# Patient Record
Sex: MALE | Race: WHITE | Employment: OTHER | URBAN - METROPOLITAN AREA
[De-identification: names, ages, dates, MRNs, and addresses within clinical notes are randomized per-mention and may not be internally consistent; named-entity substitution may affect disease eponyms.]

---

## 2019-06-28 ENCOUNTER — TRANSFERRED RECORDS (OUTPATIENT)
Dept: HEALTH INFORMATION MANAGEMENT | Facility: CLINIC | Age: 79
End: 2019-06-28

## 2019-06-28 LAB — INR PPP: NORMAL

## 2019-06-29 LAB
CREAT SERPL-MCNC: 0.84 MG/DL (ref 0.72–1.25)
GFR SERPL CREATININE-BSD FRML MDRD: >60 ML/MIN/1.73M(2)
GLUCOSE SERPL-MCNC: 110 MG/DL (ref 70–100)
POTASSIUM SERPL-SCNC: 3.9 MMOL/L (ref 3.5–5.1)

## 2019-06-30 ENCOUNTER — HOSPITAL ENCOUNTER (INPATIENT)
Facility: CLINIC | Age: 79
LOS: 9 days | Discharge: NURSING, REHAB OR LONG-TERM CARE | DRG: 467 | End: 2019-07-09
Attending: ORTHOPAEDIC SURGERY | Admitting: ORTHOPAEDIC SURGERY
Payer: COMMERCIAL

## 2019-06-30 DIAGNOSIS — M97.8XXA PERIPROSTHETIC FRACTURE OF SHAFT OF FEMUR: Primary | ICD-10-CM

## 2019-06-30 DIAGNOSIS — Z96.649 PERIPROSTHETIC FRACTURE OF SHAFT OF FEMUR: Primary | ICD-10-CM

## 2019-06-30 DIAGNOSIS — Z98.890 STATUS POST HIP SURGERY: ICD-10-CM

## 2019-06-30 LAB
ABO + RH BLD: NORMAL
ABO + RH BLD: NORMAL
ANION GAP SERPL CALCULATED.3IONS-SCNC: 9 MMOL/L (ref 3–14)
BASOPHILS # BLD AUTO: 0 10E9/L (ref 0–0.2)
BASOPHILS NFR BLD AUTO: 0.2 %
BLD GP AB SCN SERPL QL: NORMAL
BLOOD BANK CMNT PATIENT-IMP: NORMAL
BUN SERPL-MCNC: 21 MG/DL (ref 7–30)
CALCIUM SERPL-MCNC: 8.6 MG/DL (ref 8.5–10.1)
CHLORIDE SERPL-SCNC: 104 MMOL/L (ref 94–109)
CO2 SERPL-SCNC: 25 MMOL/L (ref 20–32)
CREAT SERPL-MCNC: 0.76 MG/DL (ref 0.66–1.25)
DIFFERENTIAL METHOD BLD: ABNORMAL
EOSINOPHIL # BLD AUTO: 0 10E9/L (ref 0–0.7)
EOSINOPHIL NFR BLD AUTO: 0.4 %
ERYTHROCYTE [DISTWIDTH] IN BLOOD BY AUTOMATED COUNT: 13.1 % (ref 10–15)
GFR SERPL CREATININE-BSD FRML MDRD: 87 ML/MIN/{1.73_M2}
GLUCOSE SERPL-MCNC: 181 MG/DL (ref 70–99)
HCT VFR BLD AUTO: 40.2 % (ref 40–53)
HGB BLD-MCNC: 13.7 G/DL (ref 13.3–17.7)
IMM GRANULOCYTES # BLD: 0 10E9/L (ref 0–0.4)
IMM GRANULOCYTES NFR BLD: 0.2 %
INR PPP: 1.1 (ref 0.86–1.14)
LYMPHOCYTES # BLD AUTO: 1.1 10E9/L (ref 0.8–5.3)
LYMPHOCYTES NFR BLD AUTO: 14.1 %
MCH RBC QN AUTO: 33.1 PG (ref 26.5–33)
MCHC RBC AUTO-ENTMCNC: 34.1 G/DL (ref 31.5–36.5)
MCV RBC AUTO: 97 FL (ref 78–100)
MONOCYTES # BLD AUTO: 1 10E9/L (ref 0–1.3)
MONOCYTES NFR BLD AUTO: 12.1 %
NEUTROPHILS # BLD AUTO: 5.9 10E9/L (ref 1.6–8.3)
NEUTROPHILS NFR BLD AUTO: 73 %
NRBC # BLD AUTO: 0 10*3/UL
NRBC BLD AUTO-RTO: 0 /100
PLATELET # BLD AUTO: 147 10E9/L (ref 150–450)
POTASSIUM SERPL-SCNC: 4.1 MMOL/L (ref 3.4–5.3)
RBC # BLD AUTO: 4.14 10E12/L (ref 4.4–5.9)
SODIUM SERPL-SCNC: 138 MMOL/L (ref 133–144)
SPECIMEN EXP DATE BLD: NORMAL
WBC # BLD AUTO: 8.1 10E9/L (ref 4–11)

## 2019-06-30 PROCEDURE — 25800030 ZZH RX IP 258 OP 636: Performed by: STUDENT IN AN ORGANIZED HEALTH CARE EDUCATION/TRAINING PROGRAM

## 2019-06-30 PROCEDURE — 85610 PROTHROMBIN TIME: CPT | Performed by: INTERNAL MEDICINE

## 2019-06-30 PROCEDURE — 86901 BLOOD TYPING SEROLOGIC RH(D): CPT | Performed by: INTERNAL MEDICINE

## 2019-06-30 PROCEDURE — 85025 COMPLETE CBC W/AUTO DIFF WBC: CPT | Performed by: INTERNAL MEDICINE

## 2019-06-30 PROCEDURE — 93005 ELECTROCARDIOGRAM TRACING: CPT

## 2019-06-30 PROCEDURE — 12000001 ZZH R&B MED SURG/OB UMMC

## 2019-06-30 PROCEDURE — 25000132 ZZH RX MED GY IP 250 OP 250 PS 637: Performed by: STUDENT IN AN ORGANIZED HEALTH CARE EDUCATION/TRAINING PROGRAM

## 2019-06-30 PROCEDURE — 86850 RBC ANTIBODY SCREEN: CPT | Performed by: INTERNAL MEDICINE

## 2019-06-30 PROCEDURE — HZ2ZZZZ DETOXIFICATION SERVICES FOR SUBSTANCE ABUSE TREATMENT: ICD-10-PCS | Performed by: INTERNAL MEDICINE

## 2019-06-30 PROCEDURE — 86900 BLOOD TYPING SEROLOGIC ABO: CPT | Performed by: INTERNAL MEDICINE

## 2019-06-30 PROCEDURE — 25000128 H RX IP 250 OP 636: Performed by: STUDENT IN AN ORGANIZED HEALTH CARE EDUCATION/TRAINING PROGRAM

## 2019-06-30 PROCEDURE — 36415 COLL VENOUS BLD VENIPUNCTURE: CPT | Performed by: INTERNAL MEDICINE

## 2019-06-30 PROCEDURE — 80048 BASIC METABOLIC PNL TOTAL CA: CPT | Performed by: INTERNAL MEDICINE

## 2019-06-30 PROCEDURE — 99207 ZZC MOONLIGHTING INDICATOR: CPT | Performed by: INTERNAL MEDICINE

## 2019-06-30 PROCEDURE — 93010 ELECTROCARDIOGRAM REPORT: CPT | Performed by: INTERNAL MEDICINE

## 2019-06-30 RX ORDER — LORAZEPAM 0.5 MG/1
1-4 TABLET ORAL EVERY 30 MIN PRN
Status: DISCONTINUED | OUTPATIENT
Start: 2019-06-30 | End: 2019-07-03

## 2019-06-30 RX ORDER — AMOXICILLIN 250 MG
2 CAPSULE ORAL 2 TIMES DAILY
Status: DISCONTINUED | OUTPATIENT
Start: 2019-06-30 | End: 2019-07-02

## 2019-06-30 RX ORDER — ONDANSETRON 2 MG/ML
4 INJECTION INTRAMUSCULAR; INTRAVENOUS EVERY 6 HOURS PRN
Status: DISCONTINUED | OUTPATIENT
Start: 2019-06-30 | End: 2019-07-02

## 2019-06-30 RX ORDER — ALBUTEROL SULFATE 5 MG/ML
2.5 SOLUTION RESPIRATORY (INHALATION)
Status: DISCONTINUED | OUTPATIENT
Start: 2019-06-30 | End: 2019-07-09 | Stop reason: HOSPADM

## 2019-06-30 RX ORDER — MULTIPLE VITAMINS W/ MINERALS TAB 9MG-400MCG
1 TAB ORAL DAILY
Status: DISCONTINUED | OUTPATIENT
Start: 2019-06-30 | End: 2019-07-09 | Stop reason: HOSPADM

## 2019-06-30 RX ORDER — METHOCARBAMOL 500 MG/1
500 TABLET, FILM COATED ORAL 4 TIMES DAILY PRN
Status: DISCONTINUED | OUTPATIENT
Start: 2019-06-30 | End: 2019-07-09 | Stop reason: HOSPADM

## 2019-06-30 RX ORDER — ALLOPURINOL 300 MG/1
300 TABLET ORAL DAILY
Status: DISCONTINUED | OUTPATIENT
Start: 2019-07-01 | End: 2019-07-02

## 2019-06-30 RX ORDER — METOPROLOL SUCCINATE 25 MG/1
50 TABLET, EXTENDED RELEASE ORAL DAILY
Status: DISCONTINUED | OUTPATIENT
Start: 2019-07-01 | End: 2019-07-09 | Stop reason: HOSPADM

## 2019-06-30 RX ORDER — SODIUM CHLORIDE 9 MG/ML
INJECTION, SOLUTION INTRAVENOUS
Status: DISCONTINUED
Start: 2019-06-30 | End: 2019-07-01 | Stop reason: HOSPADM

## 2019-06-30 RX ORDER — PROCHLORPERAZINE MALEATE 5 MG
5 TABLET ORAL EVERY 6 HOURS PRN
Status: DISCONTINUED | OUTPATIENT
Start: 2019-06-30 | End: 2019-07-02

## 2019-06-30 RX ORDER — ALBUTEROL SULFATE 90 UG/1
2 AEROSOL, METERED RESPIRATORY (INHALATION) EVERY 6 HOURS PRN
Status: DISCONTINUED | OUTPATIENT
Start: 2019-06-30 | End: 2019-07-09 | Stop reason: HOSPADM

## 2019-06-30 RX ORDER — ACETAMINOPHEN 325 MG/1
650 TABLET ORAL EVERY 4 HOURS PRN
Status: DISCONTINUED | OUTPATIENT
Start: 2019-07-03 | End: 2019-07-02

## 2019-06-30 RX ORDER — AMOXICILLIN 250 MG
1 CAPSULE ORAL 2 TIMES DAILY
Status: DISCONTINUED | OUTPATIENT
Start: 2019-06-30 | End: 2019-07-02

## 2019-06-30 RX ORDER — ACETAMINOPHEN 325 MG/1
975 TABLET ORAL EVERY 8 HOURS
Status: DISCONTINUED | OUTPATIENT
Start: 2019-06-30 | End: 2019-07-02

## 2019-06-30 RX ORDER — NALOXONE HYDROCHLORIDE 0.4 MG/ML
.1-.4 INJECTION, SOLUTION INTRAMUSCULAR; INTRAVENOUS; SUBCUTANEOUS
Status: DISCONTINUED | OUTPATIENT
Start: 2019-06-30 | End: 2019-07-02

## 2019-06-30 RX ORDER — HYDROMORPHONE HYDROCHLORIDE 1 MG/ML
.3-.5 INJECTION, SOLUTION INTRAMUSCULAR; INTRAVENOUS; SUBCUTANEOUS
Status: DISCONTINUED | OUTPATIENT
Start: 2019-06-30 | End: 2019-07-02

## 2019-06-30 RX ORDER — OXYCODONE HYDROCHLORIDE 5 MG/1
5-10 TABLET ORAL EVERY 4 HOURS PRN
Status: DISCONTINUED | OUTPATIENT
Start: 2019-06-30 | End: 2019-07-01

## 2019-06-30 RX ORDER — FOLIC ACID 1 MG/1
1 TABLET ORAL DAILY
Status: DISCONTINUED | OUTPATIENT
Start: 2019-06-30 | End: 2019-07-09 | Stop reason: HOSPADM

## 2019-06-30 RX ORDER — SODIUM CHLORIDE 9 MG/ML
INJECTION, SOLUTION INTRAVENOUS CONTINUOUS
Status: DISCONTINUED | OUTPATIENT
Start: 2019-06-30 | End: 2019-07-02

## 2019-06-30 RX ORDER — LANOLIN ALCOHOL/MO/W.PET/CERES
100 CREAM (GRAM) TOPICAL DAILY
Status: DISCONTINUED | OUTPATIENT
Start: 2019-06-30 | End: 2019-07-09 | Stop reason: HOSPADM

## 2019-06-30 RX ORDER — ONDANSETRON 4 MG/1
4 TABLET, ORALLY DISINTEGRATING ORAL EVERY 6 HOURS PRN
Status: DISCONTINUED | OUTPATIENT
Start: 2019-06-30 | End: 2019-07-02

## 2019-06-30 RX ORDER — LIDOCAINE 40 MG/G
CREAM TOPICAL
Status: DISCONTINUED | OUTPATIENT
Start: 2019-06-30 | End: 2019-07-02

## 2019-06-30 RX ADMIN — ACETAMINOPHEN 975 MG: 325 TABLET, FILM COATED ORAL at 17:47

## 2019-06-30 RX ADMIN — SODIUM CHLORIDE, PRESERVATIVE FREE: 5 INJECTION INTRAVENOUS at 18:50

## 2019-06-30 RX ADMIN — OXYCODONE HYDROCHLORIDE 5 MG: 5 TABLET ORAL at 20:55

## 2019-06-30 RX ADMIN — SENNOSIDES AND DOCUSATE SODIUM 1 TABLET: 8.6; 5 TABLET ORAL at 20:55

## 2019-06-30 RX ADMIN — HYDROMORPHONE HYDROCHLORIDE 0.5 MG: 1 INJECTION, SOLUTION INTRAMUSCULAR; INTRAVENOUS; SUBCUTANEOUS at 17:47

## 2019-06-30 ASSESSMENT — ACTIVITIES OF DAILY LIVING (ADL): ADLS_ACUITY_SCORE: 18

## 2019-06-30 NOTE — LETTER
Transition Communication Hand-off for Care Transitions to Next Level of Care Provider    Name: Varun Ramirez  : 1940  MRN #: [id number]  Primary Care Provider: Physician No Ref-Primary     Primary Clinic: No address on file     Reason for Hospitalization:  Chantal prosthetic femur fracture  Periprosthetic fracture of shaft of femur  Status post hip surgery  Admit Date/Time: 2019  5:21 PM  Discharge Date: 19  Payor Source: Payor: [employer] / Plan: [employer] MEDICARE ADVANTAGE / Product Type: Medicare /            Reason for Communication Hand-off Referral: Other discharge plan    Discharge Plan:  [facility] [phone], Will be followed by Dr. Neil Whitfield [phone]     Concern for non-adherence with plan of care:   Y/N N  Discharge Needs Assessment:  Needs      Most Recent Value   Anticipated Changes Related to Illness  inability to care for self   Equipment Currently Used at Home  none             Follow-up plan:    Future Appointments   Date Time Provider Department Center   2019  1:45 PM Jerome Valdez MD [facility]       Any outstanding tests or procedures:        Referrals     Future Labs/Procedures    Occupational Therapy Adult Consult     Comments:    Evaluate and treat as clinically indicated.    Reason:    - ORIF of  Left Periprosthetic Femur Proximal, Left Revision ARNOLDO on 2019 with Dr. Valdez    Physical Therapy Adult Consult     Comments:    Evaluate and treat as clinically indicated.    Reason:   - ORIF of  Left Periprosthetic Femur Proximal, Left Revision ARNOLDO on 2019 with Dr. José Miguel Arriaza, SW  8a/10A Ortho/Med/Surg and Adult [facility]    [phone]  [email or web address]    AVS/Discharge Summary is the source of truth; this is a helpful guide for improved communication of patient story

## 2019-07-01 ENCOUNTER — ANESTHESIA EVENT (OUTPATIENT)
Dept: SURGERY | Facility: CLINIC | Age: 79
DRG: 467 | End: 2019-07-01
Payer: COMMERCIAL

## 2019-07-01 ENCOUNTER — TEAM CONFERENCE (OUTPATIENT)
Dept: ORTHOPEDICS | Facility: CLINIC | Age: 79
End: 2019-07-01

## 2019-07-01 LAB
CA-I BLD-MCNC: 4.7 MG/DL (ref 4.4–5.2)
MAGNESIUM SERPL-MCNC: 2 MG/DL (ref 1.6–2.3)
PHOSPHATE SERPL-MCNC: 3.6 MG/DL (ref 2.5–4.5)

## 2019-07-01 PROCEDURE — 25800030 ZZH RX IP 258 OP 636: Performed by: STUDENT IN AN ORGANIZED HEALTH CARE EDUCATION/TRAINING PROGRAM

## 2019-07-01 PROCEDURE — 25000132 ZZH RX MED GY IP 250 OP 250 PS 637: Performed by: INTERNAL MEDICINE

## 2019-07-01 PROCEDURE — 25000132 ZZH RX MED GY IP 250 OP 250 PS 637: Performed by: STUDENT IN AN ORGANIZED HEALTH CARE EDUCATION/TRAINING PROGRAM

## 2019-07-01 PROCEDURE — 99232 SBSQ HOSP IP/OBS MODERATE 35: CPT | Performed by: INTERNAL MEDICINE

## 2019-07-01 PROCEDURE — 12000001 ZZH R&B MED SURG/OB UMMC

## 2019-07-01 PROCEDURE — 83735 ASSAY OF MAGNESIUM: CPT | Performed by: INTERNAL MEDICINE

## 2019-07-01 PROCEDURE — 84100 ASSAY OF PHOSPHORUS: CPT | Performed by: INTERNAL MEDICINE

## 2019-07-01 PROCEDURE — 82330 ASSAY OF CALCIUM: CPT | Performed by: INTERNAL MEDICINE

## 2019-07-01 PROCEDURE — 99207 ZZC CDG-MDM COMPONENT: MEETS LOW - DOWN CODED: CPT | Performed by: INTERNAL MEDICINE

## 2019-07-01 PROCEDURE — 36415 COLL VENOUS BLD VENIPUNCTURE: CPT | Performed by: INTERNAL MEDICINE

## 2019-07-01 RX ORDER — LISINOPRIL 20 MG/1
20 TABLET ORAL DAILY
COMMUNITY

## 2019-07-01 RX ORDER — ALLOPURINOL 300 MG/1
300 TABLET ORAL DAILY
COMMUNITY

## 2019-07-01 RX ORDER — LORATADINE 10 MG/1
10 TABLET ORAL DAILY PRN
Status: ON HOLD | COMMUNITY
End: 2019-07-09

## 2019-07-01 RX ORDER — OMEPRAZOLE 20 MG/1
20 TABLET, DELAYED RELEASE ORAL DAILY
Status: ON HOLD | COMMUNITY
End: 2019-07-09

## 2019-07-01 RX ORDER — SODIUM CHLORIDE 9 MG/ML
INJECTION, SOLUTION INTRAVENOUS
Status: DISCONTINUED
Start: 2019-07-01 | End: 2019-07-02 | Stop reason: HOSPADM

## 2019-07-01 RX ORDER — IBUPROFEN 200 MG
200 TABLET ORAL 2 TIMES DAILY PRN
Status: ON HOLD | COMMUNITY
End: 2019-07-09

## 2019-07-01 RX ORDER — METOPROLOL SUCCINATE 50 MG/1
50 TABLET, EXTENDED RELEASE ORAL DAILY
COMMUNITY

## 2019-07-01 RX ORDER — SODIUM CHLORIDE 9 MG/ML
INJECTION, SOLUTION INTRAVENOUS
Status: DISCONTINUED
Start: 2019-07-01 | End: 2019-07-01 | Stop reason: HOSPADM

## 2019-07-01 RX ADMIN — ACETAMINOPHEN 975 MG: 325 TABLET, FILM COATED ORAL at 17:56

## 2019-07-01 RX ADMIN — SODIUM CHLORIDE, PRESERVATIVE FREE: 5 INJECTION INTRAVENOUS at 22:31

## 2019-07-01 RX ADMIN — SODIUM CHLORIDE, PRESERVATIVE FREE: 5 INJECTION INTRAVENOUS at 11:05

## 2019-07-01 RX ADMIN — SENNOSIDES AND DOCUSATE SODIUM 2 TABLET: 8.6; 5 TABLET ORAL at 20:22

## 2019-07-01 RX ADMIN — OXYCODONE HYDROCHLORIDE 5 MG: 5 TABLET ORAL at 03:41

## 2019-07-01 RX ADMIN — ACETAMINOPHEN 975 MG: 325 TABLET, FILM COATED ORAL at 00:14

## 2019-07-01 RX ADMIN — OXYCODONE HYDROCHLORIDE 5 MG: 5 TABLET ORAL at 01:17

## 2019-07-01 RX ADMIN — ACETAMINOPHEN 975 MG: 325 TABLET, FILM COATED ORAL at 08:29

## 2019-07-01 RX ADMIN — METOPROLOL SUCCINATE 50 MG: 25 TABLET, EXTENDED RELEASE ORAL at 08:29

## 2019-07-01 ASSESSMENT — ACTIVITIES OF DAILY LIVING (ADL)
ADLS_ACUITY_SCORE: 24

## 2019-07-01 NOTE — PLAN OF CARE
VS: VSS and afebrile.    O2: Room air. O2>90%.    Output: Condom catheter draining adequately.    Last BM: 6/27.   Activity: Bedfast. Bed alarm on.    Skin: Intact.    Pain: Managed with 5-10 mg oxycodone q4hr.    CMS: Intact.    Dressing: None.    Diet: NPO.    LDA: PIV infusing continuously.    Equipment: Personal belongings.    Plan: Surgery today, 7/1 with Dr. Valdez. Time is TBD.    Additional Info: First surgical scrub was done. Wife staying in nearby hotel and will return today.   Patient is confused at times but easy to re-direct.

## 2019-07-01 NOTE — PLAN OF CARE
VS: VSS  Complains of chest pain likely result from fall/injury.    O2: >90% on RA . Denies SOB.   Output: Voiding w/ condom catheter.   Last BM: 6/29/19 per pt report   Activity: Bed alarm. Bedfast   Up for meals? No   Skin: Intact   Pain: Managed w/ scheduled tylenol. Oxy discontinued due to possible relations to confusion. Tramadol ordered, not given. Pain is tolerable.   CMS: Intact. Denies N/T   Dressing: N/A   Diet: Regular  NPO at midnight due to L femur fracture surgery 7/2   LDA: PIV L lower arm - infusing   Equipment: Personal belongings   Plan: Plan for surgery 7/2   Additional Info: Pt hard of hearing. Oriented to self and situation only. Confusion seems to be increasing throughout the shift. Dr. Maldonado notified. Continue monitoring MSSA scores. Narcotics held due to managed pain and confusion.  History of alcohol abuse (beer and nikita everyday per pt & spouse report). Fall may be related to alcohol. L femur fractured.   Makes inappropriate sexual comments towards female staff.

## 2019-07-01 NOTE — PHARMACY-ADMISSION MEDICATION HISTORY
Admission medication history for the July 1, 2019 admission is complete.     Interview Sources:  Patient's wife, care everywhere    Reliability of Source: reliable    Medication Adherence: very compliant for lisinopril, metoprolol XL and Allopurinol      Changes made to PTA medication list (reason)  Added: Allopurinol 300 mg po daily  Ibuprofen 200 mg po bid prn for mild pain  Lisinopril 20 mg po daily  Loratadine 10 mg po daily prn for congestion  Metoprolol XL 50 mg po daily  Omeprazole 20 mg po daily  Deleted: none  Changed: none    Additional medication history information:   Oxymetazoline 0.05% nasal spray 1 spray q12h and gabapentin 300 mg po bid were listed in the medical record per Care Everywhere. However, patient's wife stated that pt has not been taking them for a while. Gabapentin is not helpful for the patient.      Prior to Admission Medication List:  Prior to Admission medications    Medication Sig Last Dose Taking? Auth Provider   allopurinol (ZYLOPRIM) 300 MG tablet Take 300 mg by mouth daily 6/30/2019 at Unknown time Yes Unknown, Entered By History   ibuprofen (ADVIL/MOTRIN) 200 MG tablet Take 200 mg by mouth 2 times daily as needed for mild pain Past Week at Unknown time Yes Unknown, Entered By History   lisinopril (PRINIVIL/ZESTRIL) 20 MG tablet Take 20 mg by mouth daily 6/30/2019 at Unknown time Yes Unknown, Entered By History   loratadine (CLARITIN) 10 MG tablet Take 10 mg by mouth daily as needed for allergies Past Month at Unknown time Yes Unknown, Entered By History   metoprolol succinate ER (TOPROL-XL) 50 MG 24 hr tablet Take 50 mg by mouth daily 6/30/2019 at Unknown time Yes Unknown, Entered By History   omeprazole (PRILOSEC OTC) 20 MG EC tablet Take 20 mg by mouth daily Past Week at Unknown time Yes Unknown, Entered By History       Time spent: 15 minutes    Medication history completed by:   Rachel Root PharmD, BCPS July 1, 2019

## 2019-07-01 NOTE — PLAN OF CARE
PT order received and chart reviewed. Patient to have surgery today. Will see tomorrow for PT evaluation and subsequent treatment.

## 2019-07-01 NOTE — CONSULTS
[facility], [place]  Consult Note - Hospitalist Service     Date of Admission:  6/30/2019  Consult Requested by: Dr Madrid  Reason for Consult: Preop evaluation, medical comanagement.    Assessment & Plan   Varun Ramirez is a 78 year old male admitted on 6/30/2019.     Varun Ramirez is a 78 year old male with a left total hip arthroplasty done in 2005, now admitted with a periprosthetic femur fracture after a fall on June 28 of 2019.  He was transferred from an outside facility due to unavailability to obtain the implants for the surgery.  Patient admitted under the orthopedic team.      #Left periprosthetic hip fracture: :   Management per orthopedic team .   per Ortho: Likely to OR tomorrow.  N.p.o. after midnight.  Bedrest.  Hold anticoagulation.   > Pain management, DVT prophylaxis per primary team.    # Medical History:     Past medical history reviewed with the patient and mostly wife at bedside.  Patient confused, so history is limited.  Medical issues as discussed below.     CVS:     Permanent pacemaker in place. Placed in 2012.  Likely for sick sinus syndrome, also history of paroxysmal atrial flutter. '  Follows cardiology team.  Pacemaker recently checked, no concern per wife.  Stress test 2016: No clear any of ischemia.  Ejection fraction 50%.   No history of coronary artery disease.   Denies any chest pain or shortness of breath or lightheadedness or dizziness.  No palpitations.   No falls according to history sounds mechanical-likely related to alcohol intoxication, history of peripheral neuropathy?.     EKG 12 lead done, reviewed: Poor quality, 64/min.  Paced rhythm.    *Let anesthesia team know about pacemaker.  Contact cardiology team as needed    Blood pressure controlled currently.  Home medications:   Metoprolol XL 50 mg daily --continue.    Lisinopril 20 mg daily-hold preop.    *Not on any aspirin or anticoagulation at home.   - Monitor vitals.        #History of COPD: Stable.  Asymptomatic.  History of remote smoking.  Currently, occasionally smokes cigar.  Not on home oxygen.  Not using inhalers currently.  Had used in the past.  PFT not available  ? SANDY: Mild sleep apnea, per wife, sleep study done. did not qualify for CPAP.    --Encourage incentive spirometry  --Aggressive pulmonary toileting as needed  --Albuterol inhaler, nebulization's as needed.   --Minimize use of narcotics as able  --Supplemental oxygen as needed  --Monitor using pulse ox, capnography per post op protocol  -- Encourage smoking cessation    #History of gout: Stable.  Continue prior to admission allopurinol.     # GERD: Continue omeprazole-takes intermittently at home.     # History of alcohol abuse: Patient drinks beers, nikita daily.  Last use 3 days ago.  Denies prior alcohol withdrawal.   Patient currently appears confused, disoriented ?  Cannot rule out alcohol withdrawal  - Will keep on MSSA with lorazepam.   - MVI, thiamine, folic acid daily.   - Consider , CD consultation  -Fall precautions    #Delirium, acute: Likely related to hospitalizations, narcotics, possible alcohol withdrawal etc.   No evidence of sepsis. UA OSH: No UTI. CXR: OSH: No acute infiltrate.   CT head and cervical spine OSH: No acute fracture or abnormality.   Ethanol: 0.174     -Delirium precautions  -judicious use of narcotics  -Monitor for alcohol withdrawal as above  -Fall precautions-bed alarm, sitter prn  -Low-dose Seroquel as needed for agitation.       # Pre Op evaluation:   Medical history as discussed above.   Pre op labs: Unremarkable except thrombocytopenia likely related to alcohol use. 140s.   Falls: suspect mechanical.     RCRI: 0 risk factor: 0.4 % low risk of MACE.   Intermediate risk surgery.   Functional capacity: currently limited by hip pain. Prior per wife was active.     Complicating factors: Patient w/ Permanent pacemaker - dependent, acute delirium ? Alcohol  withdrawal. Narcotics. CODP_ stable.   -Recommendation as above.   -Follow OR protocol for pacemaker.   - Judicious selection of anesthesia mode, agents, narcotics etc.   - Close fatmata- intra- op Hemodynamic,Cardiac and Pulmonary Monitoring  - Encourage IS.   - Ct Metoprolol. Hold ACEI.     > No indication for further Pre op imaging or work up.   > Appears optimized for the procedure.     * Hospitalist team to follow closely.     The patient's care was discussed with the Patient and wife at bedside. RN. .    Sohail Galicia MD  [facility], [place]    ______________________________________________________________________    Chief Complaint     L hip pain.   Confusion.     History is obtained from the patient, electronic health record and patient's spouse.   Limited as patient is confused.     History of Present Illness      Varun Ramirez is a 78 year old male who was admitted to the hospital on June 30 of 2019 with a left periprosthetic femur fracture.  He was initially seen at an outside facility on June 28 of 2019 after he had a ground-level fall. Due to unavailability of the necessary implants for the revision the decision was made to transfer him to the [facility] for further evaluation and management.     Per history. It sounded like mechanical fall, likely related to alcohol intoxication. Pt at baseline has some balance issue per wife. Drinks beer, nikita daily. Denies any syncope or near syncope or seizure.   OSH: CT head, neck negative for acute fracture.   Currently Denies fever or chills. No cough or cp or sob. No LH or dizziness. No NVD. No dysuria or bowel complaint. No new sensory or motor complaint.   Per wife, patient more confused today. No prior hx of alc withdrawal or delirium.   No other concern.     Review of Systems   The 10 point Review of Systems is negative other than noted in the HPI or here.     Past Medical History    I have reviewed this patient's  medical history and updated it with pertinent information if needed.     Permanent pacemaker for sick sinus  Hypertension  Gout  COPD  Alcohol abuse  Tobacco use  Obesity  Mild sleep apnea    Past Surgical History   I have reviewed this patient's surgical history and updated it with pertinent information if needed.    History of hip, knee arthroplasties.    Social History   I have reviewed this patient's social history and updated it with pertinent information if needed.    History of alcohol abuse.  Smokes cigars    Family History   I have reviewed this patient's family history and updated it with pertinent information if needed.     Reviewed, no pertinent history reported.    Medications     Current medications reviewed.  Home medications includes    Allopurinol 300 daily  Metoprolol extended release 50 daily  Lisinopril 20 daily  Omeprazole 20 daily-intermittent use    Allergies   No Known Allergies    Physical Exam   Vital Signs: Temp: 99.4  F (37.4  C) Temp src: Oral BP: 143/64 Pulse: 63   Resp: 16 SpO2: 94 % O2 Device: None (Room air)    Weight: 0 lbs 0 oz      General: alert, interactive, nad. Obese.   HEENT: AT/NC, Anicteric, Moist MM  Neck: Supple. JVD not appreciated.   Respi/Chest: Non labored, CTA BL.  CVS/Heart: S1S2 regular,   GI/Abd: Soft, non tender, non distended, BS +, No g/r.  MSK/Extremities: Distally warm, well perfused.     Neuro: Alert, disoriented.  Neuro exam otherwise grossly intact.  Psychiatry: stable.   Skin: no rash on exposed areas.         Data   I personally reviewed the EKG tracing showing paced rhythm..  Results for orders placed or performed during the hospital encounter of 06/30/19 (from the past 24 hour(s))   CBC with platelets differential   Result Value Ref Range    WBC 8.1 4.0 - 11.0 10e9/L    RBC Count 4.14 (L) 4.4 - 5.9 10e12/L    Hemoglobin 13.7 13.3 - 17.7 g/dL    Hematocrit 40.2 40.0 - 53.0 %    MCV 97 78 - 100 fl    MCH 33.1 (H) 26.5 - 33.0 pg    MCHC 34.1 31.5 - 36.5  g/dL    RDW 13.1 10.0 - 15.0 %    Platelet Count 147 (L) 150 - 450 10e9/L    Diff Method Automated Method     % Neutrophils 73.0 %    % Lymphocytes 14.1 %    % Monocytes 12.1 %    % Eosinophils 0.4 %    % Basophils 0.2 %    % Immature Granulocytes 0.2 %    Nucleated RBCs 0 0 /100    Absolute Neutrophil 5.9 1.6 - 8.3 10e9/L    Absolute Lymphocytes 1.1 0.8 - 5.3 10e9/L    Absolute Monocytes 1.0 0.0 - 1.3 10e9/L    Absolute Eosinophils 0.0 0.0 - 0.7 10e9/L    Absolute Basophils 0.0 0.0 - 0.2 10e9/L    Abs Immature Granulocytes 0.0 0 - 0.4 10e9/L    Absolute Nucleated RBC 0.0    Basic metabolic panel   Result Value Ref Range    Sodium 138 133 - 144 mmol/L    Potassium 4.1 3.4 - 5.3 mmol/L    Chloride 104 94 - 109 mmol/L    Carbon Dioxide 25 20 - 32 mmol/L    Anion Gap 9 3 - 14 mmol/L    Glucose 181 (H) 70 - 99 mg/dL    Urea Nitrogen 21 7 - 30 mg/dL    Creatinine 0.76 0.66 - 1.25 mg/dL    GFR Estimate 87 >60 mL/min/[1.73_m2]    GFR Estimate If Black >90 >60 mL/min/[1.73_m2]    Calcium 8.6 8.5 - 10.1 mg/dL   INR   Result Value Ref Range    INR 1.10 0.86 - 1.14   ABO/Rh type and screen   Result Value Ref Range    ABO O     RH(D) Pos     Antibody Screen Neg     Test Valid Only At          [facility]    Specimen Expires 07/03/2019    EKG 12-lead, complete   Result Value Ref Range    Interpretation ECG Click View Image link to view waveform and result

## 2019-07-01 NOTE — TELEPHONE ENCOUNTER
SURGERY PLAN (PRE-OP PLAN)     Patient Position (indicated by x):  X  Lateral decubitus, Wixson hip positioner   X  Revision ARNOLDO drape with plastic side bags for leg   X  Cordova catheter             General Equipment Requests (indicated by x):    x   Regular OR table with Wixson   X  Small pituitary rongeur   X  Cynergen's angled curettes, narrow shaft      ORIF Equipment (indicated by x):  X  Spence retractors   X  Dall Miles beaded cerclage cable, green cable  x2 , insertion instruments      ARNOLDO Requests Back-up, don't open (indicated by x):  X  Biomet SHI long stems, splined and interlocking stems   X  Lipped liners and dual mobility options           X  IM flexible reamers + guidewire, > 9 mm       Implant Extraction/Cement spacer tools Back-up, don't open (indicated by x):    x  Midas Deandre, AC-1 (1mm tiny dissecting tip with wire guide gold handpiece)      Back-up, don't open   x   Flexible osteotomes (both black and wood handled with new replacement blades)   X  Universal stem extractor   X  Suction tip with debris trap (clear plastic disposable)   X  Biomet offset implant impactor (white handle in Valdez's cart)      Specimens and cultures (indicated by x):   X  Tissue cultures, aerobic and anaerobic without gram stain      Frozen section      pathology specimens - fresh      pathology specimens - formalin          Left total hip arthroplasty was last revised in December 2006 for instability, done by Dr Jesús Olsen. Current hip implants, Biomet orthopedics-M2A Magnum system: H2v-Mkgxop  Press Fit Cup size 50mm (Ref [id number]), Magnum Tpr Adptr 42-50mm +6mm (Ref [id number]), M2a Magnum Modular Head Sz 44mm (Ref [id number]).      Plan:  1. ORIF left periprosthetic femur fracture with cables  2. Possible revision left femoral component with BIOMET   Possible dual mobility head    Javier Guzman MD  Orthopaedic Surgery, Adult Reconstruction Fellow  Pager [phone]

## 2019-07-01 NOTE — PLAN OF CARE
Pt. admitted from [facility] at 1710. Pt. accompanied by transport and arrived with personal belongings. Report was taken from CRISTIANA Whitten in [place]. Pt. is A&Ox4 with some confusion at times, easily redirected. Continuous pulse ox is on and activated for safety. VSS. 02 sats= 94% on RA. Lung sounds= clear bilaterally with both anterior and posterior. Bowel sounds are active in all 4 quadrants. Last BM 6/27 per pt report. CMS and Neuro's are intact. Denies numbness and tingling in all extremities. Has pain in the LLE and given prn IV Dilaudid, and is well controlled. Pt. denied nausea, CP, SOB, lightheadedness, and dizziness. Is on a regular diet and appetite was good. Pt is NPO at midnight for surgery tomorrow. Pt voids spontaneously without difficulty in the urinal. Pt has urinary incontinence at times. Pt uncooperative and initially refusing fatmata cares after urinal spilled and got brief wet. Eventually staff was able to remove wet brief and place dry brief under patient. Pt very frustrated with staff when trying to turn and reposition him. Pt educated on importance, and still refuses. Pt. educated on use and purpose of the Incentive Spirometer. Wife in room at end of shift and updated on plan of care. Pt. is oriented to the room and call light system and the call light is within reach. Continue to monitor.

## 2019-07-01 NOTE — PLAN OF CARE
Patient confused at times about time and place. Pt on bedrest until surgery. Lung sounds clear throughout. Patient denies chest pain, SOB, lightheadedness, dizziness, tingling, numbness, nausea, and vomiting. Bowel sounds active, last BM 6/27, passing flatus, and tolerating regular diet-NPO at midnight. Drinking well and condom catheter in place since pt is incontinent at times and was refusing brief changes. PIV infusing in L lower. Patient able to wiggle toes. CMS intact. No dressings. Pain is tolerable and patient taking PRN oxy for pain, ice pack applied. Plan is to have surgery with Dr. Valdez tomorrow time TBD. First pre-op scrub done this shift. Wife is staying at a hotel nearby and will be back tomorrow AM. Bed alarm on since pt has been confused and continuous pulse ox in place. Patient demonstrates ability to use call light appropriately. Will continue to monitor patient.

## 2019-07-01 NOTE — PROGRESS NOTES
[facility], [facility] Progress Note - Hospitalist Service       Date of Admission:  6/30/2019  Assessment & Plan       Varun Ramirez is a 78 year old male with a left total hip arthroplasty done in 2005, now admitted with a periprosthetic femur fracture after a fall on June 28 of 2019.  He was transferred from an outside facility due to unavailability to obtain the implants for the surgery.  Patient admitted under the orthopedic team.   Individual problems and their management are outlined below    #Left periprosthetic hip fracture: :   Management per orthopedic team  Surgery now scheduled for 7/2  Pain management per primary team    From a surgical standpoint:  # Pre Op evaluation:   Pre op labs: Unremarkable except thrombocytopenia likely related to alcohol use. 147.   Falls: suspect mechanical.      RCRI: 0 risk factor: 0.4 % low risk of MACE.   Intermediate risk surgery.   Functional capacity: currently limited by hip pain. Prior per wife was active.      Complicating factors:   Patient w/ Permanent pacemaker - dependent,   acute delirium/ Alcohol withdrawal.  -Recommendation as above.   -Follow OR protocol for pacemaker.   - Judicious selection of anesthesia mode, agents, narcotics etc.   - Close fatmata- intra- op Hemodynamic,Cardiac and Pulmonary Monitoring        # Medical History:     Patient recently did a general health check up/ physical on 5/17/2019 with recommendations to loose weight     CVS: Permanent pacemaker in place. 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  Stress test 2016: No clear any of ischemia.  Ejection fraction 50%.   No history of coronary artery disease.   No palpitations.   EKG 12 lead done, reviewed: Poor quality, 64/min.  Paced rhythm.     Blood pressure controlled currently.  Home medications:   Metoprolol XL 50 mg  daily --continue.    Lisinopril 20 mg daily-hold preop.       #History of COPD:   Stable.    Asymptomatic.  History of remote smoking.    Currently, occasionally smokes cigar.  Not on home oxygen.  Not using inhalers currently.  Had used in the past.  PFT not available    ? SANDY:   Mild sleep apnea, per wife, sleep study done. did not qualify for CPAP.     --Encourage incentive spirometry  --Aggressive pulmonary toileting as needed  --Albuterol inhaler, nebulization's as needed.   --Minimize use of narcotics as able  --Supplemental oxygen as needed  --Monitor using pulse ox, capnography per post op protocol  -- Encourage smoking cessation     #History of gout:   Stable.    Continue prior to admission allopurinol.     # GERD:   Continue omeprazole-takes intermittently at home.      # History of alcohol abuse:   Patient drinks beers, nikita daily.  Last use 4 days ago.  Denies prior alcohol withdrawal.   Current disorientation likely secondary to alcohol withdrawal and other additional factors outlined above   - MSSA with lorazepam.   - MVI, thiamine, folic acid daily.   - Consider , CD consultation ( after surgery_)   - Fall precautions  - Given alcohol use, will check magnesium, phos and calcium levels as well      #Delirium, acute:   Likely related to hospitalizations, narcotics, possible alcohol withdrawal etc.   No evidence of sepsis. UA OSH: No UTI. CXR: OSH: No acute infiltrate.   CT head and cervical spine OSH: No acute fracture or abnormality.   Ethanol: 0.174      -Delirium precautions  -judicious use of narcotics  -Monitor for alcohol withdrawal as above  -Fall precautions-bed alarm, sitter prn  -Low-dose Seroquel as needed for agitation.           Diet: Regular Diet Adult    DVT Prophylaxis: To be determined after surgery   Cordova Catheter: not present  Code Status: Full Code      Disposition Plan   Expected discharge: 4 - 7 days, recommended to transitional care unit once post surgical stae  stabilized .  Entered: Erica Gaitan MD 07/01/2019, 1:47 PM       The patient's care was discussed with the Bedside Nurse, Care Coordinator/, Patient and Patient's Family.    Erica Gaitan MD  Hospitalist Service  [facility], [place]    ______________________________________________________________________    Interval History   This is the first time I am meeting with Varun. His H&P and progress of events has been reviewed.   This morning, he endorses intermittent chest pain and some SOB   No fevers or chills  No nausea, vomiting or diarrhea    Data reviewed today: I reviewed all medications, new labs and imaging results over the last 24 hours. I personally reviewed no images or EKG's today.    Physical Exam   Vital Signs: Temp: 97.4  F (36.3  C) Temp src: Oral BP: 149/71 Pulse: 58   Resp: 18 SpO2: 97 % O2 Device: None (Room air)    Weight: 0 lbs 0 oz  General Appearance: Awake, alert and not in distress  Respiratory: Clear breath sounds bilaterally. Discomfort on deep palpation in the left upper qdt, consistent with the chest pain patinet referred to   Cardiovascular: Normal heart sounds. No murmurs   GI: Soft, non tender. Normal bowel sounds   Skin: No bruising or bleeding   Other: Bilateral lower extremity edema noted.     Data   Recent Labs   Lab 06/30/19  2057   WBC 8.1   HGB 13.7   MCV 97   *   INR 1.10      POTASSIUM 4.1   CHLORIDE 104   CO2 25   BUN 21   CR 0.76   ANIONGAP 9   GEORGE 8.6   *

## 2019-07-01 NOTE — PROGRESS NOTES
Pt pulled off condom catheter and pulled out PIV. New condom catheter placed. Awaiting for new PIV.

## 2019-07-01 NOTE — PLAN OF CARE
Cxl - Pt on bedrest and going to OR today; will follow up with therapy evals tomorrow as appropriate.

## 2019-07-01 NOTE — PROGRESS NOTES
Orthopaedic Surgery Progress Note:  07/01/2019     Subjective:   Difficulty with cares overnight.  Has remained n.p.o.  Discussed that his case will be discussed with arthroplasty staff.    Objective:   /65 (BP Location: Right arm)   Pulse 69   Temp 97.3  F (36.3  C) (Oral)   Resp 14   SpO2 94%   No intake/output data recorded.  Gen: NAD. Resting comfortably in bed  Resp: Breathing comfortably on RA.    Vitals: /65 (BP Location: Right arm)   Pulse 69   Temp 97.3  F (36.3  C) (Oral)   Resp 14   SpO2 94%   Constitutional: awake, alert, cooperative, no apparent distress, appears stated age.    Eyes: The sclera are white.  Ears, Nose, Throat: The trachea is midline.  Psychiatric: The patient has a normal affect.  Respiratory: breathing non-labored  Cardiovascular: The extremities are warm and perfused.  Skin: no obvious rashes or lesions.  Musculoskeletal, Neurologic, and Spine:   No evidence of leg length deformity deformity.  Can fire TA GSC FHL EHL 5 out of 5 strength.  Sensation intact in all distributions.  Forearm warm well perfused.  Labs:  Lab Results   Component Value Date    WBC 8.1 06/30/2019    HGB 13.7 06/30/2019     (L) 06/30/2019    INR 1.10 06/30/2019        Assessment & Plan:   Assessment and Plan: Varun Ramirez is a 78 year old male with a left periprosthetic femur fracture after a ground level fall, transferred from an outside facility for further cares.    Seen by medicine considered optimized for surgery, intermediate risk.    Patient will be discussed with him of our arthroplasty staff for further treatment recommendations.  Keep n.p.o.          Venancio Reddy MD  Orthopaedic Surgery, PGY-4  Pager: [phone]        FOLLOWUP:    Future Appointments   Date Time Provider Department Center   7/1/2019  9:00 AM Nadiya Lindo, PT [facility]   7/1/2019 10:30 AM Alea Faria, EUGENIA [facility]OT [facility]   7/1/2019  6:30 PM UR PT OVERFLOW [facility]

## 2019-07-02 ENCOUNTER — APPOINTMENT (OUTPATIENT)
Dept: GENERAL RADIOLOGY | Facility: CLINIC | Age: 79
DRG: 467 | End: 2019-07-02
Attending: PHYSICIAN ASSISTANT
Payer: COMMERCIAL

## 2019-07-02 ENCOUNTER — ANESTHESIA (OUTPATIENT)
Dept: SURGERY | Facility: CLINIC | Age: 79
DRG: 467 | End: 2019-07-02
Payer: COMMERCIAL

## 2019-07-02 PROBLEM — Z98.890 STATUS POST HIP SURGERY: Status: ACTIVE | Noted: 2019-07-02

## 2019-07-02 LAB
GLUCOSE BLDC GLUCOMTR-MCNC: 102 MG/DL (ref 70–99)
INTERPRETATION ECG - MUSE: NORMAL

## 2019-07-02 PROCEDURE — 25000125 ZZHC RX 250: Performed by: ORTHOPAEDIC SURGERY

## 2019-07-02 PROCEDURE — 0SRS01A REPLACEMENT OF LEFT HIP JOINT, FEMORAL SURFACE WITH METAL SYNTHETIC SUBSTITUTE, UNCEMENTED, OPEN APPROACH: ICD-10-PCS | Performed by: ORTHOPAEDIC SURGERY

## 2019-07-02 PROCEDURE — P9041 ALBUMIN (HUMAN),5%, 50ML: HCPCS | Performed by: NURSE ANESTHETIST, CERTIFIED REGISTERED

## 2019-07-02 PROCEDURE — 00000146 ZZHCL STATISTIC GLUCOSE BY METER IP

## 2019-07-02 PROCEDURE — 40000986 XR PELVIS PORT 1/2 VW

## 2019-07-02 PROCEDURE — 25000132 ZZH RX MED GY IP 250 OP 250 PS 637: Performed by: PHYSICIAN ASSISTANT

## 2019-07-02 PROCEDURE — 25800030 ZZH RX IP 258 OP 636

## 2019-07-02 PROCEDURE — C1713 ANCHOR/SCREW BN/BN,TIS/BN: HCPCS | Performed by: ORTHOPAEDIC SURGERY

## 2019-07-02 PROCEDURE — 37000009 ZZH ANESTHESIA TECHNICAL FEE, EACH ADDTL 15 MIN: Performed by: ORTHOPAEDIC SURGERY

## 2019-07-02 PROCEDURE — 25000128 H RX IP 250 OP 636: Performed by: NURSE ANESTHETIST, CERTIFIED REGISTERED

## 2019-07-02 PROCEDURE — 71000014 ZZH RECOVERY PHASE 1 LEVEL 2 FIRST HR: Performed by: ORTHOPAEDIC SURGERY

## 2019-07-02 PROCEDURE — 27210794 ZZH OR GENERAL SUPPLY STERILE: Performed by: ORTHOPAEDIC SURGERY

## 2019-07-02 PROCEDURE — 0QS704Z REPOSITION LEFT UPPER FEMUR WITH INTERNAL FIXATION DEVICE, OPEN APPROACH: ICD-10-PCS | Performed by: ORTHOPAEDIC SURGERY

## 2019-07-02 PROCEDURE — 40000986 XR FEMUR PORT LEFT 2 VW: Mod: LT

## 2019-07-02 PROCEDURE — 25800030 ZZH RX IP 258 OP 636: Performed by: NURSE ANESTHETIST, CERTIFIED REGISTERED

## 2019-07-02 PROCEDURE — 99232 SBSQ HOSP IP/OBS MODERATE 35: CPT | Performed by: INTERNAL MEDICINE

## 2019-07-02 PROCEDURE — 40000170 ZZH STATISTIC PRE-PROCEDURE ASSESSMENT II: Performed by: ORTHOPAEDIC SURGERY

## 2019-07-02 PROCEDURE — 25800030 ZZH RX IP 258 OP 636: Performed by: ORTHOPAEDIC SURGERY

## 2019-07-02 PROCEDURE — 37000008 ZZH ANESTHESIA TECHNICAL FEE, 1ST 30 MIN: Performed by: ORTHOPAEDIC SURGERY

## 2019-07-02 PROCEDURE — 25000566 ZZH SEVOFLURANE, EA 15 MIN: Performed by: ORTHOPAEDIC SURGERY

## 2019-07-02 PROCEDURE — 0SPS0JZ REMOVAL OF SYNTHETIC SUBSTITUTE FROM LEFT HIP JOINT, FEMORAL SURFACE, OPEN APPROACH: ICD-10-PCS | Performed by: ORTHOPAEDIC SURGERY

## 2019-07-02 PROCEDURE — 12000001 ZZH R&B MED SURG/OB UMMC

## 2019-07-02 PROCEDURE — 0QS904Z REPOSITION LEFT FEMORAL SHAFT WITH INTERNAL FIXATION DEVICE, OPEN APPROACH: ICD-10-PCS | Performed by: ORTHOPAEDIC SURGERY

## 2019-07-02 PROCEDURE — 25000132 ZZH RX MED GY IP 250 OP 250 PS 637: Performed by: INTERNAL MEDICINE

## 2019-07-02 PROCEDURE — 36000070 ZZH SURGERY LEVEL 5 EA 15 ADDTL MIN - UMMC: Performed by: ORTHOPAEDIC SURGERY

## 2019-07-02 PROCEDURE — 25000128 H RX IP 250 OP 636: Performed by: ORTHOPAEDIC SURGERY

## 2019-07-02 PROCEDURE — 25000132 ZZH RX MED GY IP 250 OP 250 PS 637: Performed by: STUDENT IN AN ORGANIZED HEALTH CARE EDUCATION/TRAINING PROGRAM

## 2019-07-02 PROCEDURE — 25800030 ZZH RX IP 258 OP 636: Performed by: STUDENT IN AN ORGANIZED HEALTH CARE EDUCATION/TRAINING PROGRAM

## 2019-07-02 PROCEDURE — 25000125 ZZHC RX 250: Performed by: NURSE ANESTHETIST, CERTIFIED REGISTERED

## 2019-07-02 PROCEDURE — C1776 JOINT DEVICE (IMPLANTABLE): HCPCS | Performed by: ORTHOPAEDIC SURGERY

## 2019-07-02 PROCEDURE — 25000128 H RX IP 250 OP 636: Performed by: PHYSICIAN ASSISTANT

## 2019-07-02 PROCEDURE — 36000068 ZZH SURGERY LEVEL 5 1ST 30 MIN - UMMC: Performed by: ORTHOPAEDIC SURGERY

## 2019-07-02 DEVICE — IMP CABLE DALL MILES BEADED CBL W/SLEEVE SET 2MM 6704-0-520: Type: IMPLANTABLE DEVICE | Site: HIP | Status: FUNCTIONAL

## 2019-07-02 DEVICE — IMPLANTABLE DEVICE: Type: IMPLANTABLE DEVICE | Site: HIP | Status: FUNCTIONAL

## 2019-07-02 DEVICE — IMP HEAD MOD HIP BIOM 28MM STD 163662: Type: IMPLANTABLE DEVICE | Site: HIP | Status: FUNCTIONAL

## 2019-07-02 RX ORDER — HYDROMORPHONE HYDROCHLORIDE 1 MG/ML
.3-.5 INJECTION, SOLUTION INTRAMUSCULAR; INTRAVENOUS; SUBCUTANEOUS EVERY 5 MIN PRN
Status: DISCONTINUED | OUTPATIENT
Start: 2019-07-02 | End: 2019-07-02 | Stop reason: HOSPADM

## 2019-07-02 RX ORDER — SODIUM CHLORIDE, SODIUM LACTATE, POTASSIUM CHLORIDE, CALCIUM CHLORIDE 600; 310; 30; 20 MG/100ML; MG/100ML; MG/100ML; MG/100ML
INJECTION, SOLUTION INTRAVENOUS CONTINUOUS
Status: DISCONTINUED | OUTPATIENT
Start: 2019-07-02 | End: 2019-07-02 | Stop reason: HOSPADM

## 2019-07-02 RX ORDER — OXYCODONE HYDROCHLORIDE 5 MG/1
5-10 TABLET ORAL EVERY 4 HOURS PRN
Status: DISCONTINUED | OUTPATIENT
Start: 2019-07-02 | End: 2019-07-09 | Stop reason: HOSPADM

## 2019-07-02 RX ORDER — FENTANYL CITRATE 50 UG/ML
25-50 INJECTION, SOLUTION INTRAMUSCULAR; INTRAVENOUS
Status: DISCONTINUED | OUTPATIENT
Start: 2019-07-02 | End: 2019-07-02 | Stop reason: HOSPADM

## 2019-07-02 RX ORDER — AMOXICILLIN 250 MG
2 CAPSULE ORAL 2 TIMES DAILY
Status: DISCONTINUED | OUTPATIENT
Start: 2019-07-02 | End: 2019-07-09 | Stop reason: HOSPADM

## 2019-07-02 RX ORDER — ALBUMIN, HUMAN INJ 5% 5 %
SOLUTION INTRAVENOUS CONTINUOUS PRN
Status: DISCONTINUED | OUTPATIENT
Start: 2019-07-02 | End: 2019-07-02

## 2019-07-02 RX ORDER — GABAPENTIN 100 MG/1
100 CAPSULE ORAL AT BEDTIME
Status: CANCELLED | OUTPATIENT
Start: 2019-07-02

## 2019-07-02 RX ORDER — SODIUM CHLORIDE, SODIUM LACTATE, POTASSIUM CHLORIDE, CALCIUM CHLORIDE 600; 310; 30; 20 MG/100ML; MG/100ML; MG/100ML; MG/100ML
INJECTION, SOLUTION INTRAVENOUS CONTINUOUS
Status: DISCONTINUED | OUTPATIENT
Start: 2019-07-02 | End: 2019-07-09 | Stop reason: HOSPADM

## 2019-07-02 RX ORDER — PROCHLORPERAZINE MALEATE 5 MG
5 TABLET ORAL EVERY 6 HOURS PRN
Status: DISCONTINUED | OUTPATIENT
Start: 2019-07-02 | End: 2019-07-09 | Stop reason: HOSPADM

## 2019-07-02 RX ORDER — SODIUM CHLORIDE 9 MG/ML
INJECTION, SOLUTION INTRAVENOUS CONTINUOUS PRN
Status: DISCONTINUED | OUTPATIENT
Start: 2019-07-02 | End: 2019-07-02

## 2019-07-02 RX ORDER — NALOXONE HYDROCHLORIDE 0.4 MG/ML
.1-.4 INJECTION, SOLUTION INTRAMUSCULAR; INTRAVENOUS; SUBCUTANEOUS
Status: DISCONTINUED | OUTPATIENT
Start: 2019-07-02 | End: 2019-07-09 | Stop reason: HOSPADM

## 2019-07-02 RX ORDER — HYDROMORPHONE HYDROCHLORIDE 1 MG/ML
.3-.5 INJECTION, SOLUTION INTRAMUSCULAR; INTRAVENOUS; SUBCUTANEOUS
Status: DISCONTINUED | OUTPATIENT
Start: 2019-07-02 | End: 2019-07-09 | Stop reason: HOSPADM

## 2019-07-02 RX ORDER — OXYCODONE HYDROCHLORIDE 5 MG/1
5 TABLET ORAL EVERY 4 HOURS PRN
Status: DISCONTINUED | OUTPATIENT
Start: 2019-07-02 | End: 2019-07-02

## 2019-07-02 RX ORDER — NALOXONE HYDROCHLORIDE 0.4 MG/ML
.1-.4 INJECTION, SOLUTION INTRAMUSCULAR; INTRAVENOUS; SUBCUTANEOUS
Status: DISCONTINUED | OUTPATIENT
Start: 2019-07-02 | End: 2019-07-02

## 2019-07-02 RX ORDER — PROPOFOL 10 MG/ML
INJECTION, EMULSION INTRAVENOUS PRN
Status: DISCONTINUED | OUTPATIENT
Start: 2019-07-02 | End: 2019-07-02

## 2019-07-02 RX ORDER — ACETAMINOPHEN 325 MG/1
975 TABLET ORAL EVERY 8 HOURS
Status: COMPLETED | OUTPATIENT
Start: 2019-07-02 | End: 2019-07-05

## 2019-07-02 RX ORDER — AMOXICILLIN 250 MG
1 CAPSULE ORAL 2 TIMES DAILY
Status: DISCONTINUED | OUTPATIENT
Start: 2019-07-02 | End: 2019-07-09 | Stop reason: HOSPADM

## 2019-07-02 RX ORDER — METOPROLOL SUCCINATE 50 MG/1
50 TABLET, EXTENDED RELEASE ORAL DAILY
Status: DISCONTINUED | OUTPATIENT
Start: 2019-07-03 | End: 2019-07-02

## 2019-07-02 RX ORDER — BISACODYL 10 MG
10 SUPPOSITORY, RECTAL RECTAL DAILY
Status: DISCONTINUED | OUTPATIENT
Start: 2019-07-03 | End: 2019-07-09 | Stop reason: HOSPADM

## 2019-07-02 RX ORDER — ONDANSETRON 2 MG/ML
INJECTION INTRAMUSCULAR; INTRAVENOUS PRN
Status: DISCONTINUED | OUTPATIENT
Start: 2019-07-02 | End: 2019-07-02

## 2019-07-02 RX ORDER — LIDOCAINE 40 MG/G
CREAM TOPICAL
Status: DISCONTINUED | OUTPATIENT
Start: 2019-07-02 | End: 2019-07-09 | Stop reason: HOSPADM

## 2019-07-02 RX ORDER — FENTANYL CITRATE 50 UG/ML
INJECTION, SOLUTION INTRAMUSCULAR; INTRAVENOUS PRN
Status: DISCONTINUED | OUTPATIENT
Start: 2019-07-02 | End: 2019-07-02

## 2019-07-02 RX ORDER — ONDANSETRON 4 MG/1
4 TABLET, ORALLY DISINTEGRATING ORAL EVERY 6 HOURS PRN
Status: DISCONTINUED | OUTPATIENT
Start: 2019-07-02 | End: 2019-07-09 | Stop reason: HOSPADM

## 2019-07-02 RX ORDER — KETOROLAC TROMETHAMINE 30 MG/ML
INJECTION, SOLUTION INTRAMUSCULAR; INTRAVENOUS PRN
Status: DISCONTINUED | OUTPATIENT
Start: 2019-07-02 | End: 2019-07-02

## 2019-07-02 RX ORDER — ONDANSETRON 4 MG/1
4 TABLET, ORALLY DISINTEGRATING ORAL EVERY 30 MIN PRN
Status: DISCONTINUED | OUTPATIENT
Start: 2019-07-02 | End: 2019-07-02 | Stop reason: HOSPADM

## 2019-07-02 RX ORDER — ONDANSETRON 2 MG/ML
4 INJECTION INTRAMUSCULAR; INTRAVENOUS EVERY 30 MIN PRN
Status: DISCONTINUED | OUTPATIENT
Start: 2019-07-02 | End: 2019-07-02 | Stop reason: HOSPADM

## 2019-07-02 RX ORDER — LIDOCAINE HYDROCHLORIDE 20 MG/ML
INJECTION, SOLUTION INFILTRATION; PERINEURAL PRN
Status: DISCONTINUED | OUTPATIENT
Start: 2019-07-02 | End: 2019-07-02

## 2019-07-02 RX ORDER — SODIUM CHLORIDE, SODIUM LACTATE, POTASSIUM CHLORIDE, CALCIUM CHLORIDE 600; 310; 30; 20 MG/100ML; MG/100ML; MG/100ML; MG/100ML
INJECTION, SOLUTION INTRAVENOUS
Status: DISPENSED
Start: 2019-07-02 | End: 2019-07-03

## 2019-07-02 RX ORDER — HYDRALAZINE HYDROCHLORIDE 20 MG/ML
2.5-5 INJECTION INTRAMUSCULAR; INTRAVENOUS EVERY 10 MIN PRN
Status: DISCONTINUED | OUTPATIENT
Start: 2019-07-02 | End: 2019-07-02 | Stop reason: HOSPADM

## 2019-07-02 RX ORDER — CEFAZOLIN SODIUM 1 G/3ML
INJECTION, POWDER, FOR SOLUTION INTRAMUSCULAR; INTRAVENOUS PRN
Status: DISCONTINUED | OUTPATIENT
Start: 2019-07-02 | End: 2019-07-02

## 2019-07-02 RX ORDER — SODIUM CHLORIDE 9 MG/ML
INJECTION, SOLUTION INTRAVENOUS
Status: COMPLETED
Start: 2019-07-02 | End: 2019-07-02

## 2019-07-02 RX ORDER — CEFAZOLIN SODIUM 2 G/100ML
2 INJECTION, SOLUTION INTRAVENOUS
Status: COMPLETED | OUTPATIENT
Start: 2019-07-02 | End: 2019-07-02

## 2019-07-02 RX ORDER — ACETAMINOPHEN 325 MG/1
650 TABLET ORAL EVERY 4 HOURS PRN
Status: DISCONTINUED | OUTPATIENT
Start: 2019-07-05 | End: 2019-07-09 | Stop reason: HOSPADM

## 2019-07-02 RX ORDER — SODIUM CHLORIDE, SODIUM LACTATE, POTASSIUM CHLORIDE, CALCIUM CHLORIDE 600; 310; 30; 20 MG/100ML; MG/100ML; MG/100ML; MG/100ML
INJECTION, SOLUTION INTRAVENOUS CONTINUOUS PRN
Status: DISCONTINUED | OUTPATIENT
Start: 2019-07-02 | End: 2019-07-02

## 2019-07-02 RX ORDER — MAGNESIUM HYDROXIDE 1200 MG/15ML
LIQUID ORAL PRN
Status: DISCONTINUED | OUTPATIENT
Start: 2019-07-02 | End: 2019-07-02 | Stop reason: HOSPADM

## 2019-07-02 RX ORDER — ONDANSETRON 2 MG/ML
4 INJECTION INTRAMUSCULAR; INTRAVENOUS EVERY 6 HOURS PRN
Status: DISCONTINUED | OUTPATIENT
Start: 2019-07-02 | End: 2019-07-09 | Stop reason: HOSPADM

## 2019-07-02 RX ORDER — CEFAZOLIN SODIUM 1 G/3ML
1 INJECTION, POWDER, FOR SOLUTION INTRAMUSCULAR; INTRAVENOUS EVERY 8 HOURS
Status: DISCONTINUED | OUTPATIENT
Start: 2019-07-02 | End: 2019-07-02 | Stop reason: HOSPADM

## 2019-07-02 RX ORDER — OMEPRAZOLE 20 MG/1
20 TABLET, DELAYED RELEASE ORAL DAILY
Status: DISCONTINUED | OUTPATIENT
Start: 2019-07-03 | End: 2019-07-02

## 2019-07-02 RX ORDER — CEFAZOLIN SODIUM 2 G/100ML
2 INJECTION, SOLUTION INTRAVENOUS EVERY 8 HOURS
Status: COMPLETED | OUTPATIENT
Start: 2019-07-03 | End: 2019-07-03

## 2019-07-02 RX ADMIN — HYDROMORPHONE HYDROCHLORIDE 0.3 MG: 1 INJECTION, SOLUTION INTRAMUSCULAR; INTRAVENOUS; SUBCUTANEOUS at 18:10

## 2019-07-02 RX ADMIN — KETOROLAC TROMETHAMINE 30 MG: 30 INJECTION, SOLUTION INTRAMUSCULAR at 18:13

## 2019-07-02 RX ADMIN — ROCURONIUM BROMIDE 10 MG: 10 INJECTION INTRAVENOUS at 16:51

## 2019-07-02 RX ADMIN — FENTANYL CITRATE 25 MCG: 50 INJECTION, SOLUTION INTRAMUSCULAR; INTRAVENOUS at 15:55

## 2019-07-02 RX ADMIN — LIDOCAINE HYDROCHLORIDE 40 MG: 20 INJECTION, SOLUTION INFILTRATION; PERINEURAL at 18:10

## 2019-07-02 RX ADMIN — LIDOCAINE HYDROCHLORIDE 40 MG: 20 INJECTION, SOLUTION INFILTRATION; PERINEURAL at 14:22

## 2019-07-02 RX ADMIN — SODIUM CHLORIDE, PRESERVATIVE FREE: 5 INJECTION INTRAVENOUS at 11:08

## 2019-07-02 RX ADMIN — ROCURONIUM BROMIDE 10 MG: 10 INJECTION INTRAVENOUS at 16:22

## 2019-07-02 RX ADMIN — ASPIRIN 325 MG: 325 TABLET, DELAYED RELEASE ORAL at 20:57

## 2019-07-02 RX ADMIN — ACETAMINOPHEN 975 MG: 325 TABLET, FILM COATED ORAL at 07:27

## 2019-07-02 RX ADMIN — FENTANYL CITRATE 25 MCG: 50 INJECTION, SOLUTION INTRAMUSCULAR; INTRAVENOUS at 15:49

## 2019-07-02 RX ADMIN — SODIUM CHLORIDE, POTASSIUM CHLORIDE, SODIUM LACTATE AND CALCIUM CHLORIDE: 600; 310; 30; 20 INJECTION, SOLUTION INTRAVENOUS at 16:00

## 2019-07-02 RX ADMIN — FENTANYL CITRATE 25 MCG: 50 INJECTION, SOLUTION INTRAMUSCULAR; INTRAVENOUS at 16:28

## 2019-07-02 RX ADMIN — SODIUM CHLORIDE: 9 INJECTION, SOLUTION INTRAVENOUS at 11:08

## 2019-07-02 RX ADMIN — PHENYLEPHRINE HYDROCHLORIDE 0.2 MCG/KG/MIN: 10 INJECTION INTRAVENOUS at 15:10

## 2019-07-02 RX ADMIN — CEFAZOLIN 1 G: 1 INJECTION, POWDER, FOR SOLUTION INTRAMUSCULAR; INTRAVENOUS at 16:45

## 2019-07-02 RX ADMIN — FENTANYL CITRATE 100 MCG: 50 INJECTION, SOLUTION INTRAMUSCULAR; INTRAVENOUS at 14:22

## 2019-07-02 RX ADMIN — ALBUMIN HUMAN: 0.05 INJECTION, SOLUTION INTRAVENOUS at 14:32

## 2019-07-02 RX ADMIN — METOPROLOL SUCCINATE 50 MG: 25 TABLET, EXTENDED RELEASE ORAL at 07:26

## 2019-07-02 RX ADMIN — SUGAMMADEX 220 MG: 100 INJECTION, SOLUTION INTRAVENOUS at 18:30

## 2019-07-02 RX ADMIN — HYDROMORPHONE HYDROCHLORIDE 0.2 MG: 1 INJECTION, SOLUTION INTRAMUSCULAR; INTRAVENOUS; SUBCUTANEOUS at 17:58

## 2019-07-02 RX ADMIN — SODIUM CHLORIDE: 9 INJECTION, SOLUTION INTRAVENOUS at 14:14

## 2019-07-02 RX ADMIN — MAGNESIUM HYDROXIDE 15 ML: 400 SUSPENSION ORAL at 20:57

## 2019-07-02 RX ADMIN — OMEPRAZOLE 20 MG: 20 CAPSULE, DELAYED RELEASE ORAL at 07:25

## 2019-07-02 RX ADMIN — ALBUMIN HUMAN: 0.05 INJECTION, SOLUTION INTRAVENOUS at 16:46

## 2019-07-02 RX ADMIN — PHENYLEPHRINE HYDROCHLORIDE 100 MCG: 10 INJECTION INTRAVENOUS at 16:10

## 2019-07-02 RX ADMIN — ROCURONIUM BROMIDE 10 MG: 10 INJECTION INTRAVENOUS at 15:55

## 2019-07-02 RX ADMIN — PHENYLEPHRINE HYDROCHLORIDE 100 MCG: 10 INJECTION INTRAVENOUS at 15:21

## 2019-07-02 RX ADMIN — PHENYLEPHRINE HYDROCHLORIDE 100 MCG: 10 INJECTION INTRAVENOUS at 17:32

## 2019-07-02 RX ADMIN — ROCURONIUM BROMIDE 50 MG: 10 INJECTION INTRAVENOUS at 14:22

## 2019-07-02 RX ADMIN — ONDANSETRON 4 MG: 2 INJECTION INTRAMUSCULAR; INTRAVENOUS at 18:05

## 2019-07-02 RX ADMIN — SENNOSIDES AND DOCUSATE SODIUM 2 TABLET: 8.6; 5 TABLET ORAL at 20:58

## 2019-07-02 RX ADMIN — PROPOFOL 200 MG: 10 INJECTION, EMULSION INTRAVENOUS at 14:22

## 2019-07-02 RX ADMIN — ALLOPURINOL 300 MG: 300 TABLET ORAL at 07:25

## 2019-07-02 RX ADMIN — ROCURONIUM BROMIDE 10 MG: 10 INJECTION INTRAVENOUS at 17:23

## 2019-07-02 RX ADMIN — PHENYLEPHRINE HYDROCHLORIDE 0.4 MCG/KG/MIN: 10 INJECTION INTRAVENOUS at 14:30

## 2019-07-02 RX ADMIN — MIDAZOLAM 2 MG: 1 INJECTION INTRAMUSCULAR; INTRAVENOUS at 14:12

## 2019-07-02 RX ADMIN — CEFAZOLIN SODIUM 2 G: 2 INJECTION, SOLUTION INTRAVENOUS at 14:45

## 2019-07-02 RX ADMIN — PHENYLEPHRINE HYDROCHLORIDE 100 MCG: 10 INJECTION INTRAVENOUS at 14:30

## 2019-07-02 RX ADMIN — ROCURONIUM BROMIDE 20 MG: 10 INJECTION INTRAVENOUS at 15:09

## 2019-07-02 RX ADMIN — ACETAMINOPHEN 975 MG: 325 TABLET, FILM COATED ORAL at 20:58

## 2019-07-02 ASSESSMENT — COPD QUESTIONNAIRES
CAT_SEVERITY: MODERATE
COPD: 1

## 2019-07-02 ASSESSMENT — ENCOUNTER SYMPTOMS: DYSRHYTHMIAS: 1

## 2019-07-02 ASSESSMENT — ACTIVITIES OF DAILY LIVING (ADL)
ADLS_ACUITY_SCORE: 20
ADLS_ACUITY_SCORE: 20
ADLS_ACUITY_SCORE: 24
ADLS_ACUITY_SCORE: 20

## 2019-07-02 ASSESSMENT — LIFESTYLE VARIABLES: TOBACCO_USE: 1

## 2019-07-02 NOTE — OP NOTE
OPERATION REPORT   DATE OF OPERATION: 2019     Patient: Varun Ramirez  MRN: [id number]  : 1940    SURGEON: Jerome Valdez MD.     ASSISTANT: Javier Guzman MD - Fellow    RIVERA Srivastava    ANESTHESIOLOGIST: Anesthesiologist: Pepper Thomas MD  CRNA: Edilia Bobo APRN CRNA; Gaby Alanis APRN CRNA  Student Nurse Anesthetist: Tori Rushing    ANESTHESIA: General    PREOPERATIVE DIAGNOSIS: Periprosthetic left femur fracture    POSTOPERATIVE DIAGNOSIS: Periprosthetic left femur fracture    OPERATION(S) PERFORMED:   1. ORIF Periprosthetic left femur fracture  2. Revision left total hip arthroplasty    ESTIMATED BLOOD LOSS: 300 mL.      BACKGROUND:  Mr. Ramirez is a 78 year old male who previously had a left total hip arthroplasty.  Patient presented to the emergency department with pain and inability to ambulate.  He was found to have a periprosthetic left femur fracture.  We discussed management options including open reduction internal fixation and revision surgery.    OPERATIVE FINDINGS: Periprosthetic left femur fracture, femoral stem grossly loose    OPERATIVE PROCEDURE:   Mr. Ramirez was seen in the pre-operative area; informed consent was obtained.  The left hip was appropriately marked by the patient and the operating surgeon.  The patient was brought to the operating room and transferred to the OR bed.  General anesthesia was induced.  The patient was positioned in the lateral decubitus position with the left side up and with appropriate padding and a safety strap.   The patient's left lower extremity was prepped and draped in the standard fashion.  At this time a surgical safety timeout was performed involving the operating room nurse, anesthesia team, scrub tech, and operating surgeon.  The patient was given preoperative antibiotics and tranexamic acid.  The prior left hip incision was marked with a pen.  The incision was then opened with a 15 blade scalpel.  This was  achieved and maintained with electrocautery is extensive scar tissue in the subcutaneous tissue.  The dissection was carried deeper to the level of the IT band and fascia overlying the gluteus verona muscle; the fascia was obscured by extensive scar tissue.  The IT band and fascia were incised in line with the incision.  Self-retaining retractors were inserted.  Distally, the fascia over the vastus lateralis was incised, the vastus lateralis was then reflected anteriorly leaving a small cuff of muscle on the lateral intermuscular septum.  Perforating vessels were isolated and ligated using silk ties.  The lateral femur was exposed.  The fracture site was then exposed.  Inspection of the implant through the fracture site demonstrated that the implant was grossly loose from both the proximal and distal ends of the fracture.  Based on this information we decided to proceed with both open reduction internal fixation of the fracture as well as revision total hip arthroplasty.  Hematoma was evacuated from the fracture site.  The fracture was anatomically reduced.  4 cables were then passed around the femur; care was taken to pass cables directly on bone with no interposed soft tissue.  3 cables were used to stabilize the fracture and one additional cable was passed just distal to the fracture to prevent propagation during subsequent preparation for the revision component.  The cables were tightened, crimped, and cut.  These cables provided anatomic reduction at the fracture site and good stability.  The hip joint was then exposed.  Again there is extensive scarring around the hip.  The hip was dislocated.  Soft tissue was excised from the superior lateral aspect of the femoral stem.  The femoral stem was then explanted; at this point we noticed that there had been propagation of the fracture through the base of the greater trochanter.  The greater trochanter was a separate fragment, however it maintained attachments to  the vastus lateralis as well as the abductor musculature.  The acetabulum was then exposed.  The acetabulum was well fixed.  The prior acetabular reconstruction consisted of a unique construct including a cup cemented into a larger cup.  Given that the cup was in appropriate position and was well fixed we opted to leave it.  The femur was again exposed.  Femur was sequentially reamed up to a size 16 mm reamer with plan for a modular component.  Trial reduction was performed.  The leg lengths were symmetric and the hip was stable.  The trial components were then removed.  A 16 x 190 mm stem was then inserted this had excellent fixation.  We made for the proximal body was then done up to a size C.  A high-speed bur was used to further ream the medial aspect of the greater trochanter so the greater trochanter could be reduced appropriately with the implant in place.  A trial reduction was again performed with a 60 mm proximal body and multiple head options.  This again produced symmetric leg lengths and good stability.  The trial proximal body was removed and a 60 mm proximal body was inserted in the appropriate anteversion followed by insertion of a proximal locking bolt.  Trial reduction was again performed with 0 and -3 heads.  Trials were again removed and the trunnion was cleaned and dried.  The final construct consisted of a 28 mm standard metal head with a 28 x 44 dual mobility outer head.  This construct was  assembled on the back table and then inserted onto the clean dry trunnion.  The hip was once again reduced.  The leg lengths remain symmetric and the hip was stable.  Attention was then turned to the greater trochanter fracture.  A trochanteric claw was placed over the greater trochanter and 2 cables were passed around the medial aspect of the proximal femur and implant.  The cables were tightened, the trochanter was appropriately reduced.  Cables were then crimped and cut.  Periarticular anesthetic  injection was administered.  The wound was then copiously irrigated.  The vastus lateralis fascia was closed with a running 0 PDS suture.  The posterior soft tissue was closed with #1 Ethibond sutures.  The wound was once again irrigated.  A deep XOCHITL drain was inserted.  Closure continued with #1 Ethibond sutures closing the IT band and the posterior fascia.  0 Vicryl sutures were placed in the deep fat.  Closure continued with 2-0 vicryl buried interrupted sutures for the subcutaneous tissue and a running subcuticular 3-0 PDS.  The drain remained mobile at the completion of the closure.  Steri-strips and a sterile dressing were applied.  The patient was transferred to the hospital bed.  Mr. Ramirez had a palpable dorsalis pedis pulse prior to leaving the operating room.    POSTOPERATIVE PLAN:   Return to orthopedic floor  Pain control  Aspirin  Postop antibiotics  PT/OT  Toe-touch weightbearing  Hip abduction pillow  X-rays in PACU    FINAL IMPLANTS:   Implant Name Type Inv. Item Serial No.  Lot No. LRB No. Used   IMP CABLE DALL MILES BEADED CBL W/SLEEVE SET 2MM 6704-0-520 Metallic [employer] IMP CABLE DALL MILES BEADED CBL W/SLEEVE SET 2MM 6704-0-520  [employer] [id number] Left 3   IMP CABLE DALL MILES BEADED CBL W/SLEEVE SET 2MM 6704-0-520 Metallic Hardware/[employer] IMP CABLE DALL MILES BEADED CBL W/SLEEVE SET 2MM 6704-0-520  [employer] [id number] Left 1   IMP CABLE DALL MILES BEADED CBL W/SLEEVE SET 2MM 6704-0-520 Metallic Hardware/[employer] IMP CABLE DALL MILES BEADED CBL W/SLEEVE SET 2MM 6704-0-520  [employer] [id number] Left 1   sts distal stem with locking screw interlock, 16mm x 190mm    [employer] [id number] Left 1   standard cone prox body, size c, 60mm, type I taper    [employer] [id number] Left 1   IMP CABLE DALL MILES  LG 2.0MM Metallic [employer]ware/[employer] IMP CABLE DALL MILES  LG 2.0MM  [employer] [id number] Left 1   dual mobility bearing, 28mm head size, 44mm bearing size     [employer]ET [id number] Left 1   IMP HEAD MOD HIP BIOM 28MM STD 112439 Total Joint Component/Insert IMP HEAD MOD HIP BIOM 28MM STD 033439  [employer] [id number] Left 1     ATTESTATION: Dr. Valdez was present at all critical portions of this case and immediately available at all times during the duration of the case.     Javier Guzman MD  Orthopaedic Surgery, Adult Reconstruction Fellow  Pager [phone]    Attending MD (Dr. Jerome Valdez) Attestation:  I was present during the key portions of the procedure and I was immediately available for the entire procedure between opening and closing.    MD Fallon Machuca Family Professor  Oncology and Adult Reconstructive Surgery  Dept Orthopaedic Surgery, [facility]

## 2019-07-02 NOTE — PLAN OF CARE
Focus: Physio  D: Doing well this morning. Spoke to his wife Reyna. Incontinent of urine. Wearing attends. Have tried to use condom catheter but has falling off twice. On bedrest. Turning form side to side. Unsure of when he had a bowel movement last but doesn't feel particularly uncomfortable. Will start bowel regimen after surgery. P: Another Chlorhexidene bath before surgery.

## 2019-07-02 NOTE — PROGRESS NOTES
[facility] Progress Note - Hospitalist Service       Date of Admission:  6/30/2019  Assessment & Plan       Varun Ramirez is a 78 year old male with a left total hip arthroplasty done in 2005, now admitted with a periprosthetic femur fracture after a fall on June 28 of 2019.  He was transferred from an outside facility due to unavailability to obtain the implants for the surgery.  Patient admitted under the orthopedic team.   Individual problems and their management are outlined below    #Left periprosthetic hip fracture: :   Management per orthopedic team  Surgery scheduled for 7/2  Pain management per primary team    From a surgical standpoint:  # Pre Op evaluation:   Pre op labs: Unremarkable except thrombocytopenia likely related to alcohol use. 147.   Falls: suspect mechanical.      RCRI: 0 risk factor: 0.4 % low risk of MACE.   Intermediate risk surgery.   Functional capacity: currently limited by hip pain. Prior per wife was active.      Complicating factors:   Patient w/ Permanent pacemaker - dependent,   acute delirium/ Alcohol withdrawal ( stable at this time)   -Recommendation as above.   -Follow OR protocol for pacemaker.   - Judicious selection of anesthesia mode, agents, narcotics etc.   - Close fatmata- intra- op Hemodynamic,Cardiac and Pulmonary Monitoring        # Medical History:     Patient recently did a general health check up/ physical on 5/17/2019 with recommendations to loose weight     CVS: Permanent pacemaker in place. 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 no concerns   Stress test 2016: No clear any of ischemia.  Ejection fraction 50%.   No history of coronary artery disease.   No palpitations.   EKG 12 lead done, reviewed: Poor quality, 64/min.  Paced rhythm.     Blood pressure controlled currently.  Home medications:   Metoprolol  XL 50 mg daily --continue.    Lisinopril 20 mg daily-hold preop.       #History of COPD:   Stable.    Asymptomatic.  History of remote smoking.    Currently, occasionally smokes cigar.  Not on home oxygen.  Not using inhalers currently.  Had used in the past.  PFT not available    ? SANDY:   Mild sleep apnea, per wife, sleep study done. did not qualify for CPAP.     --Encourage incentive spirometry  --Aggressive pulmonary toileting as needed  --Albuterol inhaler, nebulization's as needed.   --Minimize use of narcotics as able  --Supplemental oxygen as needed  --Monitor using pulse ox, capnography per post op protocol  -- Encourage smoking cessation     #History of gout:   Stable.    Continue prior to admission allopurinol.     # GERD:   Continue omeprazole-takes intermittently at home.      # History of alcohol abuse:   Patient drinks beers, nikita daily.  Last use 4 days ago.  Denies prior alcohol withdrawal.   Current disorientation likely secondary to alcohol withdrawal and other additional factors outlined above   - MSSA with lorazepam (los MSSA scores, has not required Lorazepam)   - MVI, thiamine, folic acid daily.   - Consider , CD consultation ( after surgery)   - Fall precautions  - Given alcohol use, will check magnesium, phos and calcium levels as well      #Delirium, acute: ( Resolving)   Likely related to hospitalizations, narcotics, possible alcohol withdrawal etc.   No evidence of sepsis. UA OSH: No UTI. CXR: OSH: No acute infiltrate.   CT head and cervical spine OSH: No acute fracture or abnormality.   Ethanol: 0.174      -Delirium precautions  -judicious use of narcotics  -Monitor for alcohol withdrawal as above  -Fall precautions-bed alarm, sitter prn  -Low-dose Seroquel as needed for agitation.           Diet: Regular Diet Adult    DVT Prophylaxis: To be determined after surgery   Cordova Catheter: not present  Code Status: Full Code      Disposition Plan   Expected discharge: 4 - 7 days,  recommended to transitional care unit once post surgical stae stabilized .  Entered: Erica Gaitan MD 07/02/2019, 10:18 AM       The patient's care was discussed with the Bedside Nurse, Care Coordinator/, Patient and Patient's Family.    Erica Gaitan MD  Hospitalist Service  [facility], [place]    ______________________________________________________________________    Interval History   Patient was up and reading the news paper. Says he slept well   denies chest pain/ SOB   No fever or chills  NPO from midnight   Wife at bedside     Data reviewed today: I reviewed all medications, new labs and imaging results over the last 24 hours. I personally reviewed no images or EKG's today.    Physical Exam   Vital Signs: Temp: 98  F (36.7  C) Temp src: Oral BP: 146/69 Pulse: 66   Resp: 16 SpO2: 96 % O2 Device: None (Room air)    Weight: 0 lbs 0 oz  General Appearance: Awake, alert and not in distress  Respiratory: Clear breath sounds bilaterally. Discomfort on deep palpation in the left upper qdt, consistent with the chest pain patinet referred to   Cardiovascular: Normal heart sounds. No murmurs   GI: Soft, non tender. Normal bowel sounds   Skin: No bruising or bleeding   Other: Bilateral lower extremity edema noted. ( L>R)     Data   Recent Labs   Lab 06/30/19  2057   WBC 8.1   HGB 13.7   MCV 97   *   INR 1.10      POTASSIUM 4.1   CHLORIDE 104   CO2 25   BUN 21   CR 0.76   ANIONGAP 9   GEORGE 8.6   *

## 2019-07-02 NOTE — PLAN OF CARE
VS: VSS and afebrile.    O2: Room air. O2>90%.    Output: Condom catheter. Changed X1. Draining adequately.    Last BM: Pt unsure.    Activity: Bedfast. Bed alarm on.    Skin: Intact.    Pain: Denies. Leg hurts when moved.    CMS: Intact.    Dressing: None.    Diet: NPO.   LDA: PIV infusing.    Equipment: Personal belongings, condom catheter.    Plan: Surgery today, 7/2 around [id number]. Needs second surgical scrub and pre-op paper work completed.    Additional Info: Occasional confusion but easily redirected. Sometimes would call out for help instead of using his call light.   MSSA of 1.

## 2019-07-02 NOTE — ANESTHESIA PREPROCEDURE EVALUATION
Anesthesia Pre-Procedure Evaluation    Patient: Varun Ramirez   MRN:     [id number] Gender:   male   Age:    78 year old :      1940        Preoperative Diagnosis: Chantal Prosthetic Left Femur Fracture   Procedure(s):  Open Reduction Internal Fixation Left Femur Proximal, Possible Left Revision Total Hip Arthroplasty     Past Medical History:   Diagnosis Date     PONV (postoperative nausea and vomiting)       History reviewed. No pertinent surgical history.       Anesthesia Evaluation     . Pt has had prior anesthetic. Type: General    History of anesthetic complications    Nausea resolved with meds. No vomiting      ROS/MED HX    ENT/Pulmonary:     (+)sleep apnea, tobacco use, Current use Cigars packs/day  moderate COPD, doesn't use CPAP , . .    Neurologic: Comment: H/o ETOH overuse.  Fell 5 days ago, hospitalized for 5 days.    (+)neuropathy - Peripheral neuropathy,     Cardiovascular:     (+) Dyslipidemia, hypertension-range: 146/69, ---. : . . . pacemaker Reason placed: Sick Sinus Syndrome, 2 degree AV block:type: [employer] settings: DDDR - Patient is dependent on pacemaker . dysrhythmias a-flutter, . Previous cardiac testing date:results:date: results:ECG reviewed date:2019 results:AV Dual Paced date: results:          METS/Exercise Tolerance:     Hematologic:         Musculoskeletal: Comment: Gout    Irregular gait  (+) arthritis,  -       GI/Hepatic:     (+) GERD Asymptomatic on medication,       Renal/Genitourinary:  - ROS Renal section negative       Endo:  - neg endo ROS   (+) Obesity, .      Psychiatric:         Infectious Disease:  - neg infectious disease ROS       Malignancy:      - no malignancy   Other:                         PHYSICAL EXAM:   Mental Status/Neuro: A/A/O   Airway: Facies: Challenging; Thick Neck  Mallampati: II  Mouth/Opening: Full  TM distance: > 6 cm  Neck ROM: Full   Respiratory: Auscultation: CTAB     Resp. Rate: Normal     Resp. Effort: Normal      CV: Rhythm:  Irregular  Rate: Age appropriate  Heart: Normal Sounds   Comments:      Dental: Normal                  Lab Results   Component Value Date    WBC 8.1 06/30/2019    HGB 13.7 06/30/2019    HCT 40.2 06/30/2019     (L) 06/30/2019     06/30/2019    POTASSIUM 4.1 06/30/2019    CHLORIDE 104 06/30/2019    CO2 25 06/30/2019    BUN 21 06/30/2019    CR 0.76 06/30/2019     (H) 06/30/2019    GEORGE 8.6 06/30/2019    PHOS 3.6 07/01/2019    MAG 2.0 07/01/2019    INR 1.10 06/30/2019       Preop Vitals  BP Readings from Last 3 Encounters:   07/02/19 147/72    Pulse Readings from Last 3 Encounters:   07/02/19 65      Resp Readings from Last 3 Encounters:   07/02/19 18    SpO2 Readings from Last 3 Encounters:   07/02/19 97%      Temp Readings from Last 1 Encounters:   07/02/19 36.6  C (97.9  F) (Oral)    Ht Readings from Last 1 Encounters:   No data found for Ht      Wt Readings from Last 1 Encounters:   No data found for Wt    There is no height or weight on file to calculate BMI.     LDA:  Peripheral IV Left Upper forearm (Active)   Site Assessment WDL 7/2/2019  8:00 AM   Line Status Infusing 7/2/2019  8:00 AM   Phlebitis Scale 0-->no symptoms 7/2/2019  8:00 AM   Infiltration Scale 0 7/2/2019  8:00 AM   Infiltration Site Treatment Method  None 7/2/2019  8:00 AM   Extravasation? No 7/2/2019  8:00 AM   Number of days:        Peripheral IV 07/01/19 Distal;Left Lower forearm (Active)   Number of days: 1       Urethral Catheter Straight-tip 16 fr (Active)   Number of days: 0            Assessment:   ASA SCORE: 3    NPO Status: > 6 hours since completed Solid Foods; > 2 hours since completed Clear Liquids   Documentation: H&P complete; Preop Testing complete; Consents complete   Proceeding: Proceed without further delay  Tobacco Use:  NO Active use of Tobacco/UNKNOWN Tobacco use status     Plan:   Anes. Type:  General   Pre-Induction: Midazolam IV; Acetaminophen PO; Gabapentin PO   Induction:  IV (Standard); Propofol;  Rocuronium   Airway: Oral ETT   Access/Monitoring: PIV; 2nd PIV   Maintenance: Balanced   Emergence: Procedure Site   Logistics: Same Day Surgery     Postop Pain/Sedation Strategy:  Standard-Options: Opioids PRN     PONV Management:  Adult Risk Factors:, H/o PONV or Motion Sickness, Non-Smoker, Postop Opioids  Prevention: Ondansetron     CONSENT: Direct conversation   Plan and risks discussed with: Patient; Spouse                            Pepper Thomas MD

## 2019-07-02 NOTE — PLAN OF CARE
VS: VSS   O2: >90% on RA . Denies SOB.   Output: Voiding w/ condom catheter with adequate output   Last BM: Unable to remember when asked.    Activity: Bed alarm. Bedfast   Skin: Intact   Pain: Denies pain   CMS: Denies numbness and tingling   Dressing: N/A   Diet: Regular  NPO at midnight due to L femur fracture surgery 7/2   LDA: PIV L lower arm - infusing   Equipment: Personal belongings   Plan: Plan for surgery 7/2   Additional Info: Hard of hearing.  Alert and oriented x3.

## 2019-07-02 NOTE — PROGRESS NOTES
Orthopaedic Surgery Progress Note:  07/02/2019     Subjective:   No acute issues overnight.  Patient has remained n.p.o.  He was consented at bedside.  He is in agreement with the treatment plan.    Objective:   /69   Pulse 66   Temp 98  F (36.7  C)   Resp 16   SpO2 96%   No intake/output data recorded.  Gen: NAD. Resting comfortably in bed  Resp: Breathing comfortably on RA.    Vitals: /69   Pulse 66   Temp 98  F (36.7  C)   Resp 16   SpO2 96%   Constitutional: awake, alert, cooperative, no apparent distress, appears stated age.    Eyes: The sclera are white.  Ears, Nose, Throat: The trachea is midline.  Psychiatric: The patient has a normal affect.  Respiratory: breathing non-labored  Cardiovascular: The extremities are warm and perfused.  Skin: no obvious rashes or lesions.  Musculoskeletal, Neurologic, and Spine:   No evidence of leg length deformity deformity.  Can fire TA GSC FHL EHL 5 out of 5 strength.  Sensation intact in all distributions.  Forearm warm well perfused.  Labs:  Lab Results   Component Value Date    WBC 8.1 06/30/2019    HGB 13.7 06/30/2019     (L) 06/30/2019    INR 1.10 06/30/2019        Assessment & Plan:   Assessment and Plan: Varun Ramirez is a 78 year old male with a left periprosthetic femur fracture after a ground level fall, transferred from an outside facility for further cares.    Seen by medicine considered optimized for surgery, intermediate risk.      Patient will be going to the operating room with Dr. Patel today for open reduction internal fixation of the left periprosthetic femur fracture.  He was consented at bedside.  He has remained n.p.o.  The right        Venancio Reddy MD  Orthopaedic Surgery, PGY-4  Pager: [phone]        FOLLOWUP:    Future Appointments   Date Time Provider Department Center   7/1/2019  9:00 AM Nadiya Lindo, PT URPT [facility]   7/1/2019 10:30 AM Alea Faria, EUGENIA UROT [facility]   7/1/2019  6:30 PM UR PT OVERFLOW  [facility]

## 2019-07-02 NOTE — BRIEF OP NOTE
Orthopaedic Surgery Brief Op-Note      Patient: Varun Ramirez  : 1940  Date of Service: 2019 6:34 PM    Pre-operative Diagnosis: Chantal Prosthetic Left Femur Fracture  Post-operative Diagnosis: same    Procedure(s) Performed: Procedure(s):  Open Reduction Internal Fixation of  Left Periprosthetic Femur Proximal, Left Revision Total Hip Arthroplasty    Staff: Dr. Valdez  Assistants:   MD Garrick Mcfadden PA-C    Anesthesia: General  EBL: 400 cc  UOP: see anesthesia record    Implants:   Implant Name Type Inv. Item Serial No.  Lot No. LRB No. Used   IMP CABLE DALL MILES BEADED CBL W/SLEEVE SET 2MM 6704-0-520 [employer]/[place] IMP CABLE DALL MILES BEADED CBL W/SLEEVE SET 2MM 6704-0-520  [employer] [id number] Left 3   IMP CABLE DALL MILES BEADED CBL W/SLEEVE SET 2MM 6704-0-520 Met[employer]ware/[place] IMP CABLE DALL MILES BEADED CBL W/SLEEVE SET 2MM 6704-0-520  [employer] [id number] Left 1   IMP CABLE DALL KIKI BEADED CBL W/SLEEVE SET 2MM 6704-0-520 Metallic Hardware/[place] IMP CABLE DALL MILES BEADED CBL W/SLEEVE SET 2MM 6704-0-520  [employer] [id number] Left 1   sts distal stem with locking screw interlock, 16mm x 190mm    [employer] [id number] Left 1   standard cone prox body, size c, 60mm, type I taper    [employer] [id number] Left 1   IMP CABLE DALL KIKI  LG 2.0MM [employer]ware/[place] IMP CABLE DALL MILES  LG 2.0MM  [employer] [id number] Left 1   dual mobility bearing, 28mm head size, 44mm bearing size    [employer]ET [id number] Left 1   IMP HEAD MOD HIP BIOM 28MM STD 184079 Total Joint Component/Insert IMP HEAD MOD HIP BIOM 28MM STD 951079  [employer] [id number] Left 1     Drains: XOCHITL drain  Intra-op Labs/Cxs/Specimens:  None  Complications: No apparent complications during procedure  Findings: Please see dictated operative note for details    Disposition: Stable to PACU, then admit to Orthopaedics    POST OPERATIVE PLAN    Assessment/Plan: Varun Ramirez  is a 78 year old male s/p Procedure(s):  Open Reduction Internal Fixation of  Left Periprosthetic Femur Proximal, Left Revision Total Hip Arthroplasty on 7/2/2019 with Dr. Valdez.    Activity: Up with assist and assistive devices as needed until independent. Absolutely no active abduction about the hip. Posterior hip precautions to operative hip x 3 months:  1) No hip flexion beyond 90 degrees  2) No adduction beyond midline  3) No internal rotation beyond neutral   Weight bearing status: TTWB x 6 weeks, ambulate with assistive devices  Antibiotics: Cefazolin x 24 hours  Diet: Begin with clear fluids and progress diet as tolerated. Bowel regimen. Anti-emetics PRN.    DVT prophylaxis: Mechanical while in hospital with Aspirin 325mg BID x 4 weeks  Elevation: Elevate heels off of bed on pillows   Wound Care: Aquacel x 7 days.    Drains: Document output per shift, Ortho will discontinue on POD #1-2  Cordova: Remove on POD #1  Pain management: Orals PRN, IV for breakthrough only  X-rays: AP Pelvis and lateral in PACU  Physical Therapy: Mobilization, ROM, ADL's, Posterior hip precautions  Occupational Therapy: ADL's  Labs: Trend Hgb on POD #1, 2  Consults: PT, OT. Hospitalist, appreciate assistance in caring for this patient throughout the perioperative period    Future Appointments   Date Time Provider Department [place]   7/3/2019  6:30 AM UR OT OVERFLOW UROT [facility]   7/3/2019  9:00 AM Maryjo Peraza, PT URPT [facility]   7/3/2019  2:45 PM Annmarie Lopez Pt, PT [facility]       Disposition: Pending progress with therapies, pain control on orals, and medical stability, anticipate discharge to Home vs TCU on POD #1-2.    I assisted with positioning, prepping and draping, and closure.      Garrick Soto PA-C 7/2/2019 6:34 PM  Orthopaedic Surgery    Please page me at [phone] with any questions/concerns during regular weekday hours before 5pm. If there is no response, if it is a weekend, or if it is during evening  hours then please page the orthopaedic surgery resident on call.

## 2019-07-02 NOTE — PROGRESS NOTES
Pre-op will come get patient around 1230 for surgery.  Need second surgical scrub and pre-op check list completed prior.

## 2019-07-02 NOTE — ANESTHESIA CARE TRANSFER NOTE
Patient: Varun Ramirez    Procedure(s):  Open Reduction Internal Fixation of  Left Periprosthetic Femur Proximal, Left Revision Total Hip Arthroplasty    Diagnosis: Chantal Prosthetic Left Femur Fracture  Diagnosis Additional Information: No value filed.    Anesthesia Type:   No value filed.     Note:  Airway :Face Mask  Patient transferred to:PACU  Comments: Regular respirations and patent airway. VSS. IV patent and infusing. Pt resting comfortably- nodding to basic questions, cooperating with instruction to cough.  Report given to RN  Handoff Report: Identifed the Patient, Identified the Reponsible Provider, Reviewed the pertinent medical history, Discussed the surgical course, Reviewed Intra-OP anesthesia mangement and issues during anesthesia, Set expectations for post-procedure period and Allowed opportunity for questions and acknowledgement of understanding      Vitals: (Last set prior to Anesthesia Care Transfer)    CRNA VITALS  7/2/2019 1804 - 7/2/2019 1847      7/2/2019             NIBP:  148/70    NIBP Mean:  84    Ht Rate:  62    Temp:  36.7  C (98.1  F)    SpO2:  97 %    Resp Rate (observed):  14                Electronically Signed By: ABILIO Kelly CRNA  July 2, 2019  6:47 PM

## 2019-07-03 ENCOUNTER — APPOINTMENT (OUTPATIENT)
Dept: PHYSICAL THERAPY | Facility: CLINIC | Age: 79
DRG: 467 | End: 2019-07-03
Attending: PHYSICIAN ASSISTANT
Payer: COMMERCIAL

## 2019-07-03 LAB
ANION GAP SERPL CALCULATED.3IONS-SCNC: 7 MMOL/L (ref 3–14)
BUN SERPL-MCNC: 19 MG/DL (ref 7–30)
CALCIUM SERPL-MCNC: 8.3 MG/DL (ref 8.5–10.1)
CHLORIDE SERPL-SCNC: 105 MMOL/L (ref 94–109)
CO2 SERPL-SCNC: 26 MMOL/L (ref 20–32)
CREAT SERPL-MCNC: 0.82 MG/DL (ref 0.66–1.25)
GFR SERPL CREATININE-BSD FRML MDRD: 84 ML/MIN/{1.73_M2}
GLUCOSE SERPL-MCNC: 166 MG/DL (ref 70–99)
HGB BLD-MCNC: 10.9 G/DL (ref 13.3–17.7)
POTASSIUM SERPL-SCNC: 4.1 MMOL/L (ref 3.4–5.3)
SODIUM SERPL-SCNC: 138 MMOL/L (ref 133–144)

## 2019-07-03 PROCEDURE — 97162 PT EVAL MOD COMPLEX 30 MIN: CPT | Mod: GP | Performed by: PHYSICAL THERAPIST

## 2019-07-03 PROCEDURE — 97530 THERAPEUTIC ACTIVITIES: CPT | Mod: GP | Performed by: PHYSICAL THERAPIST

## 2019-07-03 PROCEDURE — 97110 THERAPEUTIC EXERCISES: CPT | Mod: GP | Performed by: PHYSICAL THERAPIST

## 2019-07-03 PROCEDURE — 80048 BASIC METABOLIC PNL TOTAL CA: CPT | Performed by: INTERNAL MEDICINE

## 2019-07-03 PROCEDURE — 36415 COLL VENOUS BLD VENIPUNCTURE: CPT | Performed by: PHYSICIAN ASSISTANT

## 2019-07-03 PROCEDURE — 12000001 ZZH R&B MED SURG/OB UMMC

## 2019-07-03 PROCEDURE — 25000132 ZZH RX MED GY IP 250 OP 250 PS 637: Performed by: INTERNAL MEDICINE

## 2019-07-03 PROCEDURE — 85018 HEMOGLOBIN: CPT | Performed by: PHYSICIAN ASSISTANT

## 2019-07-03 PROCEDURE — 25000128 H RX IP 250 OP 636: Performed by: PHYSICIAN ASSISTANT

## 2019-07-03 PROCEDURE — 99232 SBSQ HOSP IP/OBS MODERATE 35: CPT | Performed by: INTERNAL MEDICINE

## 2019-07-03 PROCEDURE — 25000132 ZZH RX MED GY IP 250 OP 250 PS 637: Performed by: PHYSICIAN ASSISTANT

## 2019-07-03 PROCEDURE — 36415 COLL VENOUS BLD VENIPUNCTURE: CPT | Performed by: INTERNAL MEDICINE

## 2019-07-03 RX ORDER — LISINOPRIL 20 MG/1
20 TABLET ORAL DAILY
Status: DISCONTINUED | OUTPATIENT
Start: 2019-07-04 | End: 2019-07-09 | Stop reason: HOSPADM

## 2019-07-03 RX ORDER — ALLOPURINOL 300 MG/1
300 TABLET ORAL DAILY
Status: DISCONTINUED | OUTPATIENT
Start: 2019-07-03 | End: 2019-07-09 | Stop reason: HOSPADM

## 2019-07-03 RX ADMIN — THIAMINE HCL TAB 100 MG 100 MG: 100 TAB at 09:06

## 2019-07-03 RX ADMIN — ASPIRIN 325 MG: 325 TABLET, DELAYED RELEASE ORAL at 09:06

## 2019-07-03 RX ADMIN — ACETAMINOPHEN 975 MG: 325 TABLET, FILM COATED ORAL at 12:38

## 2019-07-03 RX ADMIN — FOLIC ACID 1 MG: 1 TABLET ORAL at 09:06

## 2019-07-03 RX ADMIN — OMEPRAZOLE 20 MG: 20 CAPSULE, DELAYED RELEASE ORAL at 07:02

## 2019-07-03 RX ADMIN — MAGNESIUM HYDROXIDE 15 ML: 400 SUSPENSION ORAL at 10:48

## 2019-07-03 RX ADMIN — METOPROLOL SUCCINATE 50 MG: 25 TABLET, EXTENDED RELEASE ORAL at 09:06

## 2019-07-03 RX ADMIN — OXYCODONE HYDROCHLORIDE 5 MG: 5 TABLET ORAL at 07:02

## 2019-07-03 RX ADMIN — ACETAMINOPHEN 975 MG: 325 TABLET, FILM COATED ORAL at 20:34

## 2019-07-03 RX ADMIN — OXYCODONE HYDROCHLORIDE 5 MG: 5 TABLET ORAL at 12:38

## 2019-07-03 RX ADMIN — ALLOPURINOL 300 MG: 300 TABLET ORAL at 12:38

## 2019-07-03 RX ADMIN — CEFAZOLIN SODIUM 2 G: 2 INJECTION, SOLUTION INTRAVENOUS at 09:59

## 2019-07-03 RX ADMIN — OXYCODONE HYDROCHLORIDE 5 MG: 5 TABLET ORAL at 17:03

## 2019-07-03 RX ADMIN — SENNOSIDES AND DOCUSATE SODIUM 2 TABLET: 8.6; 5 TABLET ORAL at 09:07

## 2019-07-03 RX ADMIN — MULTIPLE VITAMINS W/ MINERALS TAB 1 TABLET: TAB at 09:06

## 2019-07-03 RX ADMIN — SENNOSIDES AND DOCUSATE SODIUM 2 TABLET: 8.6; 5 TABLET ORAL at 20:34

## 2019-07-03 RX ADMIN — OXYCODONE HYDROCHLORIDE 5 MG: 5 TABLET ORAL at 02:15

## 2019-07-03 RX ADMIN — ACETAMINOPHEN 975 MG: 325 TABLET, FILM COATED ORAL at 04:34

## 2019-07-03 RX ADMIN — CEFAZOLIN SODIUM 2 G: 2 INJECTION, SOLUTION INTRAVENOUS at 00:47

## 2019-07-03 ASSESSMENT — ACTIVITIES OF DAILY LIVING (ADL)
ADLS_ACUITY_SCORE: 20
ADLS_ACUITY_SCORE: 17
ADLS_ACUITY_SCORE: 22
ADLS_ACUITY_SCORE: 17
ADLS_ACUITY_SCORE: 17
ADLS_ACUITY_SCORE: 20

## 2019-07-03 ASSESSMENT — PAIN DESCRIPTION - DESCRIPTORS: DESCRIPTORS: DISCOMFORT

## 2019-07-03 NOTE — PLAN OF CARE
VS: VSS   O2: >90% on RA . Denies    Output: Voiding via condom catheter.   Last BM: 6/29/19   Activity: FWW w/ assist x2. Up w/ pt today.   Dangled feet w/ assist x2   Up for meals? No   Skin: Intact except for L hip incision   Pain: Being managed w/ scheduled tylenol q8hrs; PRN 5-10 mg oxy PO q4hrs   CMS: Intact   Dressing: Scant amount of drainage noted - pinpoint size   Diet: Regular   LDA: L hand SL , L arm SL . Hemovac L hip   Equipment: Hemovac, abductor, condom catheter, personal belongings.    Plan: Continue to monitor, manage pain.  TCU once stabilized.   Additional Info:

## 2019-07-03 NOTE — ANESTHESIA POSTPROCEDURE EVALUATION
Anesthesia POST Procedure Evaluation    Patient: Varun Ramirez   MRN:     [id number] Gender:   male   Age:    78 year old :      1940        Preoperative Diagnosis: Chantal Prosthetic Left Femur Fracture   Procedure(s):  Open Reduction Internal Fixation of  Left Periprosthetic Femur Proximal, Left Revision Total Hip Arthroplasty   Postop Comments: No value filed.       Anesthesia Type:  General  No value filed.    Reportable Event: NO     PAIN: Uncomplicated   Sign Out status: Comfortable, Well controlled pain     PONV: No PONV   Sign Out status:  No Nausea or Vomiting     Neuro/Psych: Uneventful perioperative course   Sign Out Status: Preoperative baseline; Age appropriate mentation     Airway/Resp.: Uneventful perioperative course   Sign Out Status: Non labored breathing, age appropriate RR; Resp. Status within EXPECTED Parameters     CV: Uneventful perioperative course   Sign Out status: Appropriate BP and perfusion indices; Appropriate HR/Rhythm     Disposition:   Sign Out in:  PACU  Disposition:  Floor  Recovery Course: Uneventful  Follow-Up: Not required           Last Anesthesia Record Vitals:  CRNA VITALS  2019 1804 - 2019 1904      2019             NIBP:  148/70    NIBP Mean:  84    Ht Rate:  62    Temp:  36.7  C (98.1  F)    SpO2:  97 %    Resp Rate (observed):  14          Last PACU Vitals:  Vitals Value Taken Time   /60 2019  8:10 PM   Temp 35.9  C (96.6  F) 2019  8:10 PM   Pulse 59 2019  7:45 PM   Resp 20 2019  8:10 PM   SpO2 97 % 2019  8:10 PM   Temp src     NIBP     Pulse     SpO2     Resp     Temp     Ht Rate     Temp 2     Vitals shown include unvalidated device data.      Electronically Signed By: Stefan Benton DO, 2019, 8:56 PM

## 2019-07-03 NOTE — PROGRESS NOTES
[facility] Progress Note - Hospitalist Service       Date of Admission:  6/30/2019  Assessment & Plan       Varun Ramirez is a 78 year old male with a left total hip arthroplasty done in 2005, now admitted with a periprosthetic femur fracture after a fall on June 28 of 2019.  He was transferred from an outside facility due to unavailability to obtain the implants for the surgery.  Patient admitted under the orthopedic team.   Individual problems and their management are outlined below    #Left periprosthetic hip fracture: #POD 1   S/P opertaive repair on 7/2  Management by primary team. Please see their notes for further details  Stop IV fluids   Pain control with acetaminophen 975 mg every 8 hrs for 3 days, Oxycodone 5-10 mg every 3 hrs PRN and IV hydromorphone 0.3-0.5 mg q 3 hrs for breakthrough pain   DVT prophylaxis with  SCDs while in hospital, 325 mg aspirin BID x4 weeks total.  PT/OT as per protocol   Incentive spirometry and aggressive bowel regimen  We will monitor for acute blood loss anemia. Post op Hgb at 10.9        # Medical History:     Patient recently did a general health check up/ physical on 5/17/2019 with recommendations to loose weight     [facility]: Permanent pacemaker in place. 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  Stress test 2016: No clear any of ischemia.  Ejection fraction 50%.   No history of coronary artery disease.   No palpitations.   EKG 12 lead done, reviewed: Poor quality, 64/min.  Paced rhythm.     Blood pressure controlled currently.  Home medications:   Metoprolol XL 50 mg daily --continue.    Lisinopril 20 mg daily- resume from tomorrow ( 7/4)        #History of COPD:   Stable.    Asymptomatic.  History of remote smoking.    Currently, occasionally smokes cigar.  Not on home oxygen.  Not using inhalers  currently.  Had used in the past.  PFT not available    ? SANDY:   Mild sleep apnea, per wife, sleep study done. did not qualify for CPAP.     --Encourage incentive spirometry  --Aggressive pulmonary toileting as needed  --Albuterol inhaler, nebulization's as needed.   --Minimize use of narcotics as able  --Supplemental oxygen as needed  --Monitor using pulse ox, capnography per post op protocol  -- Encourage smoking cessation     #History of gout:   Stable.    Continue prior to admission allopurinol.     # GERD:   Continue omeprazole-takes intermittently at home.      # History of alcohol abuse:   Patient drinks beers, nikita daily.  Last use 4 days ago.  Denies prior alcohol withdrawal.   Current disorientation likely secondary to alcohol withdrawal and other additional factors outlined above   - MSSA with lorazepam. ( will stop this as currently detoxed)   - MVI, thiamine, folic acid daily.   - Consider , CD consultation   - Fall precautions  - Given alcohol use, will check magnesium, phos and calcium levels as well      #Delirium, acute:   Likely related to hospitalizations, narcotics, possible alcohol withdrawal etc.   No evidence of sepsis. UA OSH: No UTI. CXR: OSH: No acute infiltrate.   CT head and cervical spine OSH: No acute fracture or abnormality.   Ethanol: 0.174 on admission      -Delirium precautions  -judicious use of narcotics  -Monitor for alcohol withdrawal as above  -Fall precautions-bed alarm, sitter prn  -Low-dose Seroquel as needed for agitation.           Diet: Regular Diet Adult    DVT Prophylaxis: To be determined after surgery   Cordova Catheter: in place, indication: Anesthesia  Code Status: Full Code      Disposition Plan   Expected discharge: 4 - 7 days, recommended to transitional care unit once post surgical stae stabilized .  Entered: Erica Gaitan MD 07/03/2019, 11:25 AM       The patient's care was discussed with the Bedside Nurse, Care Coordinator/Social  Worker, Patient and Patient's Family.    Erica Gaitan MD  Hospitalist Service  [facility] [place]    ______________________________________________________________________    Interval History    feels well today   No new complaints' wife at bedside  Has pain which is controlled with pain medications   No chest pain/ SOB   No fever or chills       Data reviewed today: I reviewed all medications, new labs and imaging results over the last 24 hours. I personally reviewed no images or EKG's today.    Physical Exam   Vital Signs: Temp: 98.6  F (37  C) Temp src: Oral BP: 127/58 Pulse: 60 Heart Rate: 64 Resp: 16 SpO2: 95 % O2 Device: None (Room air) Oxygen Delivery: 3 LPM  Weight: 240 lbs 4.82 oz  General Appearance: Awake, alert and not in distress  Respiratory: Clear breath sounds bilaterally. Discomfort on deep palpation in the left upper qdt, consistent with the chest pain patinet referred to   Cardiovascular: Normal heart sounds. No murmurs   GI: Soft, non tender. Normal bowel sounds   Skin: No bruising or bleeding   Other: Bilateral lower extremity edema noted.     Data   Recent Labs   Lab 07/03/19  0916 07/03/19  0509 06/30/19  2057   WBC  --   --  8.1   HGB  --  10.9* 13.7   MCV  --   --  97   PLT  --   --  147*   INR  --   --  1.10     --  138   POTASSIUM 4.1  --  4.1   CHLORIDE 105  --  104   CO2 26  --  25   BUN 19  --  21   CR 0.82  --  0.76   ANIONGAP 7  --  9   GEORGE 8.3*  --  8.6   *  --  181*

## 2019-07-03 NOTE — PROGRESS NOTES
07/03/19 0900   Quick Adds   Type of Visit Initial PT Evaluation   Living Environment   Lives With spouse   Living Arrangements house   Home Accessibility no concerns   Living Environment Comment has elevator to lower level but rarely go there.   Self-Care   Usual Activity Tolerance poor   Current Activity Tolerance poor   Regular Exercise No   Equipment Currently Used at Home none   Activity/Exercise/Self-Care Comment Pt has a FWW and SEC but does not use on a regular basis.  Spouse present and provides hx as pt confused and demonstrating memory impairment   Functional Level Prior   Ambulation 0-->independent   Transferring 0-->independent   Toileting 0-->independent   Bathing 0-->independent   Communication 0-->understands/communicates without difficulty   Cognition 1 - attention or memory deficits   Fall history within last six months yes   Number of times patient has fallen within last six months   (multiple, 3 on the day he was admitted)   Which of the above functional risks had a recent onset or change? ambulation   Prior Functional Level Comment Per spouse, pt drinking, denies it and hide nikita in the house, still driving, limited mob outside of house   General Information   Onset of Illness/Injury or Date of Surgery - Date 07/02/19   Referring Physician Christina Soto   Patient/Family Goals Statement none stated   Pertinent History of Current Problem (include personal factors and/or comorbidities that impact the POC) pt admitted after fall while drinking, with fatmata prosthetic femoral fx, s/p ORIF and revision of L ARNOLDO   Precautions/Limitations fall precautions;left hip precautions  (no active abd, post precautions)   Weight-Bearing Status - LUE full weight-bearing   Weight-Bearing Status - RUE full weight-bearing   Weight-Bearing Status - LLE toe touch weight-bearing   Weight-Bearing Status - RLE full weight-bearing   General Observations pt spouse present, voicing concerns dur to pt's drinking,  "denial and refusal for treatment, with increased cog deficits   Cognitive Status Examination   Orientation person   Level of Consciousness alert   Follows Commands and Answers Questions 25% of the time;able to follow single-step instructions   Personal Safety and Judgment impaired;at risk behaviors demonstrated   Memory impaired   Pain Assessment   Patient Currently in Pain Yes, see Vital Sign flowsheet   Range of Motion (ROM)   ROM Comment L LE limited due to pain, s/p ARNOLDO/ORIF   Strength   Strength Comments L LE limtied due to pain s/p ARNOLDO/ORIF   Bed Mobility   Bed Mobility Comments bed mob with max A x 3/dep    Transfer Skills   Transfer Comments sit to stand with max A x 2, unable to maintain TTWB L LE   Gait   Gait Comments unable   Balance   Balance Comments impaired steanding balance with TTWB   General Therapy Interventions   Planned Therapy Interventions bed mobility training;gait training;strengthening;transfer training   Clinical Impression   Criteria for Skilled Therapeutic Intervention yes, treatment indicated   PT Diagnosis  difficulty walking   Influenced by the following impairments post op pain, decreased strength, balance, ROM, act bianca, cog deficits, anxiety, post op restrictions, ETOH abuse with decreased insight in to condition   Functional limitations due to impairments impaired functional mob I and safety   Clinical Presentation Evolving/Changing   Clinical Presentation Rationale 3- 5 deficits   Clinical Decision Making (Complexity) Moderate complexity   Therapy Frequency 2x/day   Predicted Duration of Therapy Intervention (days/wks) 4 days   Anticipated Discharge Disposition Transitional Care Facility   Risk & Benefits of therapy have been explained Yes   Patient, Family & other staff in agreement with plan of care Yes   [facility] AM-PAC TM \"6 Clicks\"   2016, Trustees of [facility], under license to "[employer].  All rights reserved.   6 Clicks Short Forms Basic Mobility " "Inpatient Short Form   [facility] AM-PAC  \"6 Clicks\" V.2 Basic Mobility Inpatient Short Form   1. Turning from your back to your side while in a flat bed without using bedrails? 2 - A Lot   2. Moving from lying on your back to sitting on the side of a flat bed without using bedrails? 2 - A Lot   3. Moving to and from a bed to a chair (including a wheelchair)? 1 - Total   4. Standing up from a chair using your arms (e.g., wheelchair, or bedside chair)? 1 - Total   5. To walk in hospital room? 1 - Total   6. Climbing 3-5 steps with a railing? 1 - Total   Basic Mobility Raw Score (Score out of 24.Lower scores equate to lower levels of function) 8   Total Evaluation Time   Total Evaluation Time (Minutes) 15     "

## 2019-07-03 NOTE — PLAN OF CARE
Discharge Planner PT   Patient plan for discharge: none stated, pt spouse agreeable to rehab  Current status: PT eval completed and treatment initiated.  Pt admitted after fall at home while drinking sustaining fatmata prosthetic fx, s/p ORIF and rev of L ARNOLDO, TTWB, post prec and no act abd.  Pt previously living with spouse in home with no stairs, pt I with ADL's but slow and has difficulty.  Pt spouse present and voicing concerns for pt's drinking, states he drives and is able to obtain alcohol and hides it from her, she states he denies he has a problem, had 3 falls the day of admit.  Pt's spouse has noted increased confusion over last year, not sure how much is drinking and how much might just be dementia.  Pt has refused treatment in past.  Pt currently confused, does not know where he is or why.  Difficulty following even simple commands with rep.  Max A x 3 to move supine to sit on EOB and to return, dep scoot in bed, max A x 2 to stand with FWW but not able to maintain TTWB, eventually PT able to hold L foot off floor chile linens changed, stood for 2-3 min.  Barriers to return to prior living situation: level of A for all mob, fall risk, cog deficits with decreased insight into condition, TTWB, unable to maintain for standing, gait or transfers  Recommendations for discharge: rehab  Rationale for recommendations: Pt will need cont therapies to improve strength, ROM, balance, safety training and act bianca to progress functional mob I and safety.  May ultimately need alternate living environment       Entered by: MANNY NGUYEN 07/03/2019 12:39 PM

## 2019-07-03 NOTE — PLAN OF CARE
February 7, 2018      Rayne Kitchen MD  [address] [facility] [address]           [facility] Otolaryngology  [address] [facility] [address] [place] [address]  Phone: [phone]  Fax: [phone]          Patient: Nicki Sanchez   MR Number: [id number]   YOB: 2015   Date of Visit: 2/7/2018       Dear Dr. Rayne Kitchen:    Thank you for referring Nicki Sanchez to me for evaluation. Attached you will find relevant portions of my assessment and plan of care.    If you have questions, please do not hesitate to call me. I look forward to following Nicki Sanchez along with you.    Sincerely,    Luisito Kitchen MD    Enclosure  CC:  No Recipients    If you would like to receive this communication electronically, please contact [email or web address] or [phone] to request more information on [facility] access.    For providers and/or their staff who would like to refer a patient to [facility], please contact us through our one-stop-shop provider referral line, [facility], at [phone].    If you feel you have received this communication in error or would no longer like to receive these types of communications, please e-mail [email or web address]          OT order received and chart reviewed.  In discussion with PT, patient not appropriate for OT evaluation today.  He demonstrated confusion and had significant difficulty following 1-step commands, and was Ax3 to stand and maintain TTWB briefly.  Will hold OT evaluation until patient able to participate in a meaningful way.

## 2019-07-03 NOTE — PROGRESS NOTES
Orthopedic Surgery Progress Note    Subjective: No acute events overnight. RN reporting moderate confusion, ongoing. AOx1 (self). Pain controlled this morning with current regimen. Tolerating PO trials. Voiding spontaneously. Denies cp, sob, calf pain, fevers, chills, numbness/tingling in distal extremity.    Exam:  /61 (BP Location: Right arm)   Pulse 60   Temp 97.5  F (36.4  C) (Oral)   Resp 16   Wt 109 kg (240 lb 4.8 oz)   SpO2 94%   Gen: Awake, alert, NAD  Resp: breathing equal and non-labored  Extremities:    LLE: operative dressing c/d/i. Drain in place holding suction. 5/5 EHL/FHL/TA/Gsc. SILT in s/s/dp/sp/t distributions. 2+ PT pulses. Toes WWP    Drain: 90    Labs:  Recent Labs   Lab 06/30/19 2057   WBC 8.1   HGB 13.7   *     Recent Labs   Lab 07/01/19  1407 06/30/19 2057   NA  --  138   POTASSIUM  --  4.1   CHLORIDE  --  104   CO2  --  25   BUN  --  21   CR  --  0.76   GLC  --  181*   MAG 2.0  --    PHOS 3.6  --      Recent Labs   Lab 06/30/19 2057   INR 1.10       Assessment:   78 year old male with PMH of CVS with pacemaker, COPD, SANDY, gout, GERD, EtOH abuse who presents with a periprosthetic fracture around Left ARNOLDO and is now s/p:    Procedure:  - ORIF of  Left Periprosthetic Femur Proximal, Left Revision ARNOLDO on 7/2/2019 with Dr. Valdez    Plan:  Ortho Primary  Activity: Up with assist and assistive devices as needed until independent. Absolutely no active abduction about the hip. Posterior hip precautions to operative hip x 3 months:  1) No hip flexion beyond 90 degrees  2) No adduction beyond midline  3) No internal rotation beyond neutral   Weight bearing status: TTWB x 6 weeks, ambulate with assistive devices  Antibiotics: Cefazolin x 24 hours  Diet: Begin with clear fluids and progress diet as tolerated. Bowel regimen. Anti-emetics PRN.    DVT prophylaxis: Mechanical while in hospital with Aspirin 325mg BID x 4 weeks  Elevation: Elevate heels off of bed on pillows   Wound Care:  Aquacel x 7 days.    Drains: Document output per shift, Ortho will discontinue on POD #1-2  Cordova: Remove on POD #1  Pain management: Orals PRN, IV for breakthrough only  X-rays: AP Pelvis and lateral in PACU  Physical Therapy: Mobilization, ROM, ADL's, Posterior hip precautions  Occupational Therapy: ADL's  Labs: Trend Hgb on POD #1, 2  Consults: PT, OT. Hospitalist, appreciate assistance in caring for this patient throughout the perioperative period  Follow Up: with Dr. Valdez clinic in 4 weeks for wound check    Dispo: Pending progress with therapies, pain control on orals, and medical stability, anticipate discharge to Home vs TCU on POD #1-2    Future Appointments   Date Time Provider Department Center   7/3/2019  6:30 AM UR OT OVERFLOW [facility] [place]   7/3/2019  9:00 AM Maryjo Peraza, PT [facility] [place]   7/3/2019  2:45 PM Annmarie Lopez Pt, PT [facility] [place]        Yury Mendoza, PGY4  Orthopedic Surgery Resident  Pager [phone]

## 2019-07-03 NOTE — CONSULTS
Social Work: Assessment with Discharge Plan    Patient Name:  Breonna Ramirez  :  1940  Age:  78 year old  MRN:  [id number]  Risk/Complexity Score:  Filed Complexity Screen Score: 4  Completed assessment with:  Bedside RN Kajal, 8A IDT, pt, pt's wife Reyna, Daughter Shira    Presenting Information   Reason for Referral:  Discharge plan  Date of Intake:  July 3, 2019  Referral Source:  Nurse  Decision Maker:  pt  Alternate Decision Maker:  Wife Reyna    Living Situation:  House  Previous Functional Status:  Independent  Patient and family understanding of hospitalization:  Sent from OSH to treat periprosthetic fx following fall he sustained on 19  Cultural/Language/Spiritual Considerations:  , hx of alcohol use (heavy)  Adjustment to Illness:  Pt currently experiencing post op confusion    Physical Health  Reason for Admission:  Perprosthetic fx  Services Needed/Recommended:  TCU    Mental Health/Chemical Dependency  Diagnosis:  Alcohol Use disorder  Support/Services in Place:  None noted  Services Needed/Recommended:  Abstain from drinking    Support System  Significant relationship at present time:  Spouse and daughter  Family of origin is available for support:  yes  Other support available:  Other family   Gaps in support system:  None noted  Patient is caregiver to:  None     Provider Information   Primary Care Physician:  No Ref-Primary, Physician   None   Clinic:  No address on file      :      Financial   Income Source:  Pension, social security  Financial Concerns:   None noted  Insurance:    Payor/Plan Subscriber Name Rel Member # Group #   [employer] - [employer]* BREONNA RAMIREZ  [id number]       [id number]       Discharge Plan   Patient and family discharge goal:  TCU. Discussed needing to find TCU with [employer] Contract ([facility] does not have [employer] Contract)  Provided education on discharge plan:  YES  Patient agreeable to discharge plan:  YES  A list of  Medicare Certified Facilities was provided to the patient and/or family to encourage patient choice. Patient's choices for facility are:  [facility] in [place],[facility] or [facility]. Referrals initiated via Epic  Will NH provide Skilled rehabilitation or complex medical:  YES  General information regarding anticipated insurance coverage and possible out of pocket cost was discussed. Patient and patient's family are aware patient may incur the cost of transportation to the facility, pending insurance payment: YES  Barriers to discharge:  Medical stability    Discharge Recommendations   Anticipated Disposition:  TCU  Transportation Needs:  Other:  to be determined    Additional comments   KORI introduced role/reason for visit. Pt and family were receptive. They were hopeful pt could be referred to [facility] and shared disappointment that they do not have [employer] Contract. Pt's daughter works in Dental Office and was able to suggest facilities in [place], [place]. Pt and family also agreed to having referral to [facility] and [facility].     Pt's spouse states that she is stressed with hospital experience and has worry that pt will return home too soon. She anticipates pt may need to be in Care Center until his weight bearing restriction can be lifted.  KORI provided support, reassurance and validation. Pt and family receptive.      KORI initiated referrals to facilities via [facility], [phone] Cell: [phone] and voicemail message left for Irma.   Await assessment response. KORI con't to follow    Addendum 1622:  Rekha Toscano con't to assess,will call with response.

## 2019-07-03 NOTE — PLAN OF CARE
"  VS: Temp: 96.6  F (35.9  C) Temp src: Oral BP: 129/63 Pulse: 59 Heart Rate: 63 Resp: 18    O2: SpO2: 97 % O2 Device: None (Room air)    Output: Urine output adequate via [place]ona   Last BM: Pt unsure   Activity: Ax2 to reposition in bed   Skin: Intact - incision, bruises, scabs   Pain: Scheduled Tylenol, Oxycodone, and ice packs   CMS: Intact - denies numbness or tingling   Dressing: CDI   Diet: Regular diet, well tolerated. No N/V reported   LDA: PIV infusing, PIV in hand SL   Equipment: PCDs, hip abductor, bed alarm, cont pulse ox   Plan: TBD   Additional Info: Pt disoriented to time, situation, and place. Pt attempted to get out of bed and pull at lines saying \"I have to be disconnected so I can go home\" Writer reassured and reoriented pt.   Writer spoke with pt wife and she stated the confusion has been only getting worse since they were at the hospital in [place].        "

## 2019-07-03 NOTE — PLAN OF CARE
Oriented to self and states he knows he broke his hip but not oriented to place or time or that he had surgery. Resting in bed between checks medicated for pain with relief. Cordova catheter in place good output. Drain patent. Tolerating po fluids.CMS intact.

## 2019-07-04 LAB — HGB BLD-MCNC: 11.1 G/DL (ref 13.3–17.7)

## 2019-07-04 PROCEDURE — 25000132 ZZH RX MED GY IP 250 OP 250 PS 637: Performed by: PHYSICIAN ASSISTANT

## 2019-07-04 PROCEDURE — 85018 HEMOGLOBIN: CPT | Performed by: PHYSICIAN ASSISTANT

## 2019-07-04 PROCEDURE — 12000001 ZZH R&B MED SURG/OB UMMC

## 2019-07-04 PROCEDURE — 36415 COLL VENOUS BLD VENIPUNCTURE: CPT | Performed by: PHYSICIAN ASSISTANT

## 2019-07-04 PROCEDURE — 25000132 ZZH RX MED GY IP 250 OP 250 PS 637: Performed by: INTERNAL MEDICINE

## 2019-07-04 PROCEDURE — 99232 SBSQ HOSP IP/OBS MODERATE 35: CPT | Performed by: INTERNAL MEDICINE

## 2019-07-04 RX ADMIN — OMEPRAZOLE 20 MG: 20 CAPSULE, DELAYED RELEASE ORAL at 08:34

## 2019-07-04 RX ADMIN — ASPIRIN 325 MG: 325 TABLET, DELAYED RELEASE ORAL at 08:34

## 2019-07-04 RX ADMIN — ACETAMINOPHEN 975 MG: 325 TABLET, FILM COATED ORAL at 20:13

## 2019-07-04 RX ADMIN — SENNOSIDES AND DOCUSATE SODIUM 2 TABLET: 8.6; 5 TABLET ORAL at 20:13

## 2019-07-04 RX ADMIN — FOLIC ACID 1 MG: 1 TABLET ORAL at 08:34

## 2019-07-04 RX ADMIN — ACETAMINOPHEN 975 MG: 325 TABLET, FILM COATED ORAL at 12:04

## 2019-07-04 RX ADMIN — SENNOSIDES AND DOCUSATE SODIUM 2 TABLET: 8.6; 5 TABLET ORAL at 08:34

## 2019-07-04 RX ADMIN — ALLOPURINOL 300 MG: 300 TABLET ORAL at 08:34

## 2019-07-04 RX ADMIN — ACETAMINOPHEN 975 MG: 325 TABLET, FILM COATED ORAL at 04:55

## 2019-07-04 RX ADMIN — THIAMINE HCL TAB 100 MG 100 MG: 100 TAB at 08:34

## 2019-07-04 RX ADMIN — MULTIPLE VITAMINS W/ MINERALS TAB 1 TABLET: TAB at 08:34

## 2019-07-04 RX ADMIN — ASPIRIN 325 MG: 325 TABLET, DELAYED RELEASE ORAL at 20:13

## 2019-07-04 RX ADMIN — METOPROLOL SUCCINATE 50 MG: 25 TABLET, EXTENDED RELEASE ORAL at 08:34

## 2019-07-04 ASSESSMENT — ACTIVITIES OF DAILY LIVING (ADL)
ADLS_ACUITY_SCORE: 19
ADLS_ACUITY_SCORE: 21
ADLS_ACUITY_SCORE: 19
ADLS_ACUITY_SCORE: 19

## 2019-07-04 NOTE — PROGRESS NOTES
[facility], [facility] Progress Note - Hospitalist Service       Date of Admission:  6/30/2019  Assessment & Plan       Varun Ramirez is a 78 year old male with a left total hip arthroplasty done in 2005, now admitted with a periprosthetic femur fracture after a fall on June 28 of 2019.  He was transferred from an outside facility due to unavailability to obtain the implants for the surgery.  Patient admitted under the orthopedic team.   Individual problems and their management are outlined below    #Left periprosthetic hip fracture: #POD 2   S/P opertaive repair on 7/2  Management by primary team. Please see their notes for further details  Pain control with acetaminophen 975 mg every 8 hrs for 3 days, Oxycodone 5-10 mg every 3 hrs PRN  DVT prophylaxis with  SCDs while in hospital, 325 mg aspirin BID x4 weeks total.  PT/OT as per protocol   Incentive spirometry and aggressive bowel regimen  We will monitor for acute blood loss anemia. 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  Stress test 2016: No clear any of ischemia.  Ejection fraction 50%.   No history of coronary artery disease.   No palpitations.   EKG 12 lead done, reviewed: Poor quality, 64/min.  Paced rhythm.     Blood pressure controlled currently.  Home medications:   Metoprolol XL 50 mg daily --continue.     Resume Lisinopril 20 mg daily        #History of COPD:   Stable.    Asymptomatic.  History of remote smoking.    Currently, occasionally smokes cigar.  Not on home oxygen.  Not using inhalers currently.  Had used in the past.  PFT not available    ? SANDY:   Mild sleep apnea, per wife, sleep  study done. did not qualify for CPAP.     --Encourage incentive spirometry  --Aggressive pulmonary toileting as needed  --Albuterol inhaler, nebulization's as needed.   --Minimize use of narcotics as able  --Supplemental oxygen as needed  --Monitor using pulse ox, capnography per post op protocol  -- Encourage smoking cessation     #History of gout:   Stable.    Continue prior to admission allopurinol.     # GERD:   Continue omeprazole-takes intermittently at home.      # History of alcohol abuse:   Patient drinks beers, nikita daily.  Last use 4 days ago.  Denies prior alcohol withdrawal.   Current disorientation likely secondary to alcohol withdrawal and other additional factors outlined above   - MSSA with lorazepam. This has been stopped as currently has completed the detox phase   - MVI, thiamine, folic acid daily.   - Fall precautions  - Magnesium, phos and calcium levels are within normal limits      #Delirium, acute:  ( Resolved)  Likely related to hospitalizations, narcotics, possible alcohol withdrawal etc.   No evidence of sepsis. UA OSH: No UTI. CXR: OSH: No acute infiltrate.   CT head and cervical spine OSH: No acute fracture or abnormality.   Ethanol: 0.174 on admission      -Delirium precautions  -judicious use of narcotics  -Monitor for alcohol withdrawal as above  -Fall precautions-bed alarm, sitter prn  -Low-dose Seroquel as needed for agitation.           Diet: Regular Diet Adult    DVT Prophylaxis: To be determined after surgery   Cordova Catheter: not present  Code Status: Full Code      Disposition Plan   Expected discharge: 4 - 7 days, recommended to transitional care unit once post surgical stae stabilized .  Entered: Erica Gaitan MD 07/04/2019, 9:11 AM       The patient's care was discussed with the Bedside Nurse, Care Coordinator/, Patient and Patient's Family.    Erica Gaitan MD  Hospitalist Service  [facility]  [place]    ______________________________________________________________________    Interval History    Varun feels well today   Pain much better controlled   No fever or chills   eating and drinking well      Data reviewed today: I reviewed all medications, new labs and imaging results over the last 24 hours. I personally reviewed no images or EKG's today.    Physical Exam   Vital Signs: Temp: 96.5  F (35.8  C) Temp src: Oral BP: 118/51 Pulse: 61 Heart Rate: 62 Resp: 16 SpO2: 95 % O2 Device: None (Room air)    Weight: 240 lbs 4.82 oz  General Appearance: Awake, alert and not in distress  Respiratory: Clear breath sounds bilaterally. Discomfort on deep palpation in the left upper qdt, consistent with the chest pain patinet referred to   Cardiovascular: Normal heart sounds. No murmurs   GI: Soft, non tender. Normal bowel sounds   Skin: No bruising or bleeding   Other: Bilateral lower extremity edema noted.     Data   Recent Labs   Lab 07/04/19  0627 07/03/19  0916 07/03/19  0509 06/30/19  2057   WBC  --   --   --  8.1   HGB 11.1*  --  10.9* 13.7   MCV  --   --   --  97   PLT  --   --   --  147*   INR  --   --   --  1.10   NA  --  138  --  138   POTASSIUM  --  4.1  --  4.1   CHLORIDE  --  105  --  104   CO2  --  26  --  25   BUN  --  19  --  21   CR  --  0.82  --  0.76   ANIONGAP  --  7  --  9   GEORGE  --  8.3*  --  8.6   GLC  --  166*  --  181*

## 2019-07-04 NOTE — PLAN OF CARE
"  VS: Stable. Disoriented, confused.   O2: RA, cont pulse ox on.   Output: Condom cath, changed at 0300.   Last BM: Pt unable to recall, pt reported it may have been \"2 weeks ago\", previous shift reported it was 6/29, passing flatus, senna given.   Activity: Ax3 with walker, TTWB, refused frequent repositioning.   Skin: Incision, bruises.   Pain: Aching pain in left hip managed with scheduled tylenol.   CMS: Intact.   Dressing: CDI, drainage on XOCHITL dressing.   Diet: Regular.   LDA: PIV left hand SL. PIV left lower forearm SL. XOCHITL to bulb suction with bloody/red output.   Equipment: Walker, abduction pillow, IS, PCDs, cont pulse ox, bed alarm, pillows, call light within reach.   Plan: Continue to monitor. TCU upon discharge.   Additional Info:       "

## 2019-07-04 NOTE — PROGRESS NOTES
Orthopaedic Surgery Progress Note:       Subjective:   Patient reports some pain overnight. Denies new onset tingling/numbness in operative extremity. Denies fever/chills/SOB/nause/vomiting.     Objective:   Temp:  [98.5  F (36.9  C)-98.8  F (37.1  C)] 98.5  F (36.9  C)  Pulse:  [62] 62  Heart Rate:  [60-64] 62  Resp:  [16] 16  BP: (116-127)/(41-58) 116/41  SpO2:  [95 %-98 %] 98 %    Intake/Output Summary (Last 24 hours) at 7/4/2019 0547  Last data filed at 7/3/2019 2221  Gross per 24 hour   Intake --   Output 775 ml   Net -775 ml       Gen: NAD. Resting comfortably in bed  Resp: Breathing comfortably on RA  LE:  Dressing/ACE is c/d/i.   Drain is draining bloody/serous fluid. 150 ml to 0000  Circulatory: DP Pulse Intact, Foot Perfused   Neurologic: intact        Labs:  Recent Labs   Lab 07/03/19  0509 06/30/19  2057   WBC  --  8.1   HGB 10.9* 13.7   PLT  --  147*        Assessment & Plan:   - ORIF of  Left Periprosthetic Femur Proximal, Left Revision ARNOLDO on 7/2/2019 with Dr. Valdez    Drain removed by me today - patient reported some pain during, reassured   I would imagine he will require TCU     Plan:  Ortho Primary  Activity: Up with assist and assistive devices as needed until independent. Absolutely no active abduction about the hip. Posterior hip precautions to operative hip x 3 months:  1) No hip flexion beyond 90 degrees  2) No adduction beyond midline  3) No internal rotation beyond neutral   Weight bearing status: TTWB x 6 weeks, ambulate with assistive devices  Antibiotics: Cefazolin x 24 hours  Diet: Begin with clear fluids and progress diet as tolerated. Bowel regimen. Anti-emetics PRN.    DVT prophylaxis: Mechanical while in hospital with Aspirin 325mg BID x 4 weeks  Elevation: Elevate heels off of bed on pillows   Wound Care: Aquacel x 7 days.    Drains: Document output per shift, Ortho will discontinue on POD #1-2  Cordova: Remove on POD #1  Pain management: Orals PRN, IV for breakthrough only  X-rays:  AP Pelvis and lateral in PACU  Physical Therapy: Mobilization, ROM, ADL's, Posterior hip precautions  Occupational Therapy: ADL's  Labs: Trend Hgb on POD #1, 2  Consults: PT, OT. Hospitalist, appreciate assistance in caring for this patient throughout the perioperative period  Follow Up: with Dr. Valdez clinic in 4 weeks for wound check     Dispo: Pending progress with therapies, pain control on orals, and medical stability, anticipate discharge to Home vs TCU on POD #1-2    Dr Gilbert Morris 07/04/2019  Orthopedic Fellow  Pager: [phone]

## 2019-07-04 NOTE — PLAN OF CARE
VS: VSS, confused at times.  Disoriented to time, place, and situation. Oriented to self. Aware he had surgery and that he is in the hospital at times.    O2: >90% on RA   Output: Voiding via condom catheter, changed at 1200 due to leaking. Brief in place as well. Urine dark in color, encouraged fluids.    Last BM: Per pt report, LBM 2 weeks ago. Requesting suppository this evening (refused while family visited). BS active. Smear seen in brief when changed.    Activity: TTWB to LLE, heavy assist of 2-3. Boosted in bed with assist of 2, refused repositioning throughout shift.    Skin: Skin intact ex for incision to L hip, skin is warm and occasionally moist due to sweating.    Pain: Pain in L hip, use narcotics with caution. Refused medication when offered. Given scheduled tylenol and ice utilized.    CMS: DP +2 bilaterally, able to wiggle toes.    Dressing: Dressing to L hip CDI, dressing to drain site replaced due to saturation with drainage, continue to monitor. Mepilex to coccyx CDI.   Diet: Tolerating regular diet without N/V, adequate intake of PO fluids    LDA: PIV x2 SL, drain removed this morning, condom catheter    Equipment: Condom catheter, IV pole, PCD's, abduction pillow, personal belongings at bedside    Plan: Continue to monitor patient, manage pain. Plan to discharge to TCU once medically stable.    Additional Info: Pleasant and cooperative throughout shift, confused at times.  Bed alarm on for safety

## 2019-07-05 ENCOUNTER — APPOINTMENT (OUTPATIENT)
Dept: PHYSICAL THERAPY | Facility: CLINIC | Age: 79
DRG: 467 | End: 2019-07-05
Attending: ORTHOPAEDIC SURGERY
Payer: COMMERCIAL

## 2019-07-05 PROCEDURE — 25000132 ZZH RX MED GY IP 250 OP 250 PS 637: Performed by: INTERNAL MEDICINE

## 2019-07-05 PROCEDURE — 97110 THERAPEUTIC EXERCISES: CPT | Mod: GP | Performed by: PHYSICAL THERAPIST

## 2019-07-05 PROCEDURE — 25000132 ZZH RX MED GY IP 250 OP 250 PS 637: Performed by: PHYSICIAN ASSISTANT

## 2019-07-05 PROCEDURE — 99232 SBSQ HOSP IP/OBS MODERATE 35: CPT | Performed by: INTERNAL MEDICINE

## 2019-07-05 PROCEDURE — 97530 THERAPEUTIC ACTIVITIES: CPT | Mod: GP | Performed by: PHYSICAL THERAPIST

## 2019-07-05 PROCEDURE — 12000001 ZZH R&B MED SURG/OB UMMC

## 2019-07-05 PROCEDURE — 99207 ZZC CDG-MDM COMPONENT: MEETS MODERATE - UP CODED: CPT | Performed by: INTERNAL MEDICINE

## 2019-07-05 RX ADMIN — MULTIPLE VITAMINS W/ MINERALS TAB 1 TABLET: TAB at 08:01

## 2019-07-05 RX ADMIN — ACETAMINOPHEN 975 MG: 325 TABLET, FILM COATED ORAL at 12:09

## 2019-07-05 RX ADMIN — ACETAMINOPHEN 650 MG: 325 TABLET, FILM COATED ORAL at 21:12

## 2019-07-05 RX ADMIN — SENNOSIDES AND DOCUSATE SODIUM 2 TABLET: 8.6; 5 TABLET ORAL at 08:01

## 2019-07-05 RX ADMIN — OMEPRAZOLE 20 MG: 20 CAPSULE, DELAYED RELEASE ORAL at 08:00

## 2019-07-05 RX ADMIN — SENNOSIDES AND DOCUSATE SODIUM 2 TABLET: 8.6; 5 TABLET ORAL at 21:12

## 2019-07-05 RX ADMIN — THIAMINE HCL TAB 100 MG 100 MG: 100 TAB at 08:01

## 2019-07-05 RX ADMIN — ASPIRIN 325 MG: 325 TABLET, DELAYED RELEASE ORAL at 08:01

## 2019-07-05 RX ADMIN — FOLIC ACID 1 MG: 1 TABLET ORAL at 08:01

## 2019-07-05 RX ADMIN — METOPROLOL SUCCINATE 50 MG: 25 TABLET, EXTENDED RELEASE ORAL at 08:01

## 2019-07-05 RX ADMIN — LISINOPRIL 20 MG: 20 TABLET ORAL at 08:01

## 2019-07-05 RX ADMIN — ASPIRIN 325 MG: 325 TABLET, DELAYED RELEASE ORAL at 21:12

## 2019-07-05 RX ADMIN — ACETAMINOPHEN 975 MG: 325 TABLET, FILM COATED ORAL at 04:36

## 2019-07-05 RX ADMIN — ALLOPURINOL 300 MG: 300 TABLET ORAL at 08:01

## 2019-07-05 ASSESSMENT — ACTIVITIES OF DAILY LIVING (ADL)
ADLS_ACUITY_SCORE: 19

## 2019-07-05 NOTE — PLAN OF CARE
OT: Patient is participating in PT more and appropriate for initiation of OT services on 7/6, will schedule evaluation.

## 2019-07-05 NOTE — PROGRESS NOTES
Orthopaedic Surgery Progress Note:       Subjective:   Far better oriented that yesterday morning.     Pain well controlled on current regimen. Denies new onset tingling/numbness in operative extremity. Denies fever/chills/SOB/nause/vomiting.     Objective:   Temp:  [96.5  F (35.8  C)-98.3  F (36.8  C)] 98  F (36.7  C)  Pulse:  [61] 61  Heart Rate:  [62-68] 68  Resp:  [16] 16  BP: (118-138)/(51-63) 138/63  SpO2:  [95 %-96 %] 96 %    Intake/Output Summary (Last 24 hours) at 7/5/2019 0642  Last data filed at 7/5/2019 0520  Gross per 24 hour   Intake --   Output 700 ml   Net -700 ml       Gen: NAD. Resting comfortably in bed  Resp: Breathing comfortably on RA  LE:  Dressing/ACE is c/d/i.   Circulatory: DP Pulse Intact, Foot Perfused   Neurologic:       Labs:  Recent Labs   Lab 07/04/19  0627 07/03/19  0509 06/30/19  2057   WBC  --   --  8.1   HGB 11.1* 10.9* 13.7   PLT  --   --  147*        Assessment & Plan:   - ORIF of  Left Periprosthetic Femur Proximal, Left Revision ARNOLDO on 7/2/2019 with Dr. Valdez     Plan:  Ortho Primary  Activity: Up with assist and assistive devices as needed until independent. Absolutely no active abduction about the hip. Posterior hip precautions to operative hip x 3 months:  1) No hip flexion beyond 90 degrees  2) No adduction beyond midline  3) No internal rotation beyond neutral   Weight bearing status: TTWB x 6 weeks, ambulate with assistive devices  Antibiotics: Cefazolin x 24 hours  Diet: Begin with clear fluids and progress diet as tolerated. Bowel regimen. Anti-emetics PRN.    DVT prophylaxis: Mechanical while in hospital with Aspirin 325mg BID x 4 weeks  Elevation: Elevate heels off of bed on pillows   Wound Care: Aquacel x 7 days.    Drains: Document output per shift, Ortho will discontinue on POD #1-2  Cordova: Remove on POD #1  Pain management: Orals PRN, IV for breakthrough only  X-rays: AP Pelvis and lateral in PACU  Physical Therapy: Mobilization, ROM, ADL's, Posterior hip  precautions  Occupational Therapy: ADL's  Labs: Trend Hgb on POD #1, 2  Consults: PT, OT. Hospitalist, appreciate assistance in caring for this patient throughout the perioperative period  Follow Up: with Dr. Valdez clinic in 4 weeks for wound check     Dispo: Pending progress with therapies, pain control on orals, and medical stability, anticipate discharge to Home vs TCU on POD #1-2     Dr Gilbert Morris 07/05/2019  Orthopedic Fellow  Pager: [phone]

## 2019-07-05 NOTE — PLAN OF CARE
Patient only oriented to self, seems confused at times. Pt thought it was December and that we were in [place]. Pt up with heavy A2 TTWB. Lung sounds clear throughout. Patient denies chest pain, SOB, lightheadedness, dizziness, tingling, numbness, nausea, and vomiting. Bowel sounds active, pt states last BM was 2 wks ago but refuses suppositories, passing flatus, and tolerating regular diet. Drinking well and has condom catheter in place. PIV x2 both SL. Patient able to wiggle toes. CMS intact. Dressing clean, dry, and intact. Pain is tolerable and patient taking scheduled tylenol only for pain, ice pack applied. Plan is to discharge to TCU once bed is available. Patient demonstrates ability to use call light appropriately. Wife at bedside and helps to reorient pt. Will continue to monitor patient.

## 2019-07-05 NOTE — PLAN OF CARE
VS: VSS, confused at times.  Disoriented to place and situation, able to report today's correct date and year. Oriented to self.    O2: >90% on RA, continuous pulse ox.    Output: Voiding via condom catheter, changed at 1200 due to leaking. Brief in place as well. Urine dark in color, encouraged fluids.    Last BM: Per pt report, LBM 2 weeks ago. Refused suppository when offered this morning. BS active. Smear seen in brief 7/4.   Activity: TTWB to LLE, heavy assist of 2-3. Boosted in bed with assist of 2, refused repositioning throughout shift. Stood at EOB with therapy and staff this morning, improving. Therapy will start to see patient 2x daily and OT will start 7/6.    Skin: Skin intact ex for incision to L hip, skin is warm and occasionally moist. Scars noted to bilateral knees from previous surgery.    Pain: Pain in L hip, use narcotics with caution. Refused medication when offered. Given scheduled tylenol and ice utilized. Has not received oxycodone since 7/3 at 1700.    CMS: DP +2 bilaterally, able to wiggle toes. Denies N/T.    Dressing: Dressing to L hip CDI, dressing to drain site replaced due to saturation with drainage, continue to monitor.   Diet: Tolerating regular diet without N/V, adequate intake of PO fluids    LDA: PIV x2 SL, condom catheter    Equipment: Condom catheter, IV pole, PCD's, abduction pillow, personal belongings at bedside    Plan: Continue to monitor patient, manage pain. Plan to discharge to TCU once medically stable.    Additional Info: Pleasant and cooperative throughout shift, confused at times.  Bed alarm on for safety  Today is patient and wife's 61 year anniversary.

## 2019-07-05 NOTE — PLAN OF CARE
Discharge Planner PT   Patient plan for discharge: TCU  Current status: Supine to/from sitting with HOB elevated and mod to max x 2.  Sit to standing at EOB with FWW and mod A x 2, cues for TTWB.  Pt was able to maintain TTWB when standing at EOB and tolerated standing at EOB while linens and brief was changed.  Standing to sitting at EOB with FWW and min A x 2.  Seated scooting towards HOB with min A x 1 and assist of 2 for scooting towards HOB in supine.    Barriers to return to prior living situation: Level of assist, safety, and deconditioned  Recommendations for discharge: TCU  Rationale for recommendations: Pt would benefit from further skilled PT for LE strengthening and increase independence with all functional mobility.       Entered by: Annmarie Lopez 07/05/2019 11:20 AM

## 2019-07-05 NOTE — PROGRESS NOTES
[facility] Progress Note - Hospitalist Service       Date of Admission:  6/30/2019  Assessment & Plan       Varun Ramirez is a 78 year old male with a left total hip arthroplasty done in 2005, now admitted with a periprosthetic femur fracture after a fall on June 28 of 2019.  He was transferred from an outside facility due to unavailability to obtain the implants for the surgery.  Patient admitted under the orthopedic team.   Individual problems and their management are outlined below    #Left periprosthetic hip fracture: #POD 3   S/P opertaive repair on 7/2  Management by primary team. Please see their notes for further details  Pain control with acetaminophen 975 mg every 8 hrs for 3 days, Oxycodone 5-10 mg every 3 hrs PRN  DVT prophylaxis with  SCDs while in hospital, 325 mg aspirin BID x4 weeks total.  PT/OT as per protocol   Incentive spirometry and aggressive bowel regimen  We will monitor for acute blood loss anemia. 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  Stress test 2016: No clear any of ischemia.  Ejection fraction 50%.   No history of coronary artery disease.   No palpitations.   EKG 12 lead done, reviewed: Poor quality, 64/min.  Paced rhythm.     Blood pressure controlled currently.  Home medications:   Metoprolol XL 50 mg daily --continue.    Resume Lisinopril 20 mg daily        #History of COPD:   Stable.    Asymptomatic.  History of remote smoking.    Currently, occasionally smokes cigar.  Not on home oxygen.  Not using inhalers currently.  Had used in the past.  PFT not available    ? SANDY:   Mild sleep apnea, per wife, sleep  study done. did not qualify for CPAP.     --Encourage incentive spirometry  --Aggressive pulmonary toileting as needed  --Albuterol inhaler, nebulization's as needed.   --Minimize use of narcotics as able  --Supplemental oxygen as needed  -- Encourage smoking cessation     #History of gout:   Stable.    Continue prior to admission allopurinol.     # GERD:   Continue omeprazole-takes intermittently at home.      # History of alcohol abuse:   Patient drinks beers, nikita daily.  Last use 4 days ago.  Denies prior alcohol withdrawal.   Current disorientation likely secondary to alcohol withdrawal and other additional factors outlined above   - MSSA with lorazepam. This has been stopped as currently has completed the detox phase   - MVI, thiamine, folic acid daily.   - Fall precautions  - Magnesium, phos and calcium levels are within normal limits      #Delirium, acute:  ( Resolved)  Likely related to hospitalizations, narcotics, possible alcohol withdrawal etc.   No evidence of sepsis. UA OSH: No UTI. CXR: OSH: No acute infiltrate.   CT head and cervical spine OSH: No acute fracture or abnormality.   Ethanol: 0.174 on admission      -Delirium precautions  -judicious use of narcotics  -Monitor for alcohol withdrawal as above  -Fall precautions-bed alarm, sitter prn  -Low-dose Seroquel as needed for agitation.           Diet: Regular Diet Adult    DVT Prophylaxis: To be determined after surgery   Cordova Catheter: not present  Code Status: Full Code      Disposition Plan   Expected discharge: Per primary team. Medically stable to discharge to TCU   Entered: Erica Gaitan MD 07/05/2019, 10:09 AM       The patient's care was discussed with the Bedside Nurse, Care Coordinator/, Patient and Patient's Family.    Erica Gaitan MD  Hospitalist Service  [facility] [place]    ______________________________________________________________________    Interval  History    Varun continues to feel well. No new complaints overnight   No chest pain/ SOB   No fever or chills   Eating and drinking well       Data reviewed today: I reviewed all medications, new labs and imaging results over the last 24 hours. I personally reviewed no images or EKG's today.    Physical Exam   Vital Signs: Temp: 97.2  F (36.2  C) Temp src: Oral BP: 141/59   Heart Rate: 61 Resp: 16 SpO2: 91 % O2 Device: None (Room air)    Weight: 240 lbs 4.82 oz  General Appearance: Awake, alert and not in distress  Respiratory: Clear breath sounds bilaterally. Discomfort on deep palpation in the left upper qdt, consistent with the chest pain patinet referred to   Cardiovascular: Normal heart sounds. No murmurs   GI: Soft, non tender. Normal bowel sounds   Skin: No bruising or bleeding   Other: Bilateral lower extremity edema noted.     Data   Recent Labs   Lab 07/04/19  0627 07/03/19  0916 07/03/19  0509 06/30/19  2057   WBC  --   --   --  8.1   HGB 11.1*  --  10.9* 13.7   MCV  --   --   --  97   PLT  --   --   --  147*   INR  --   --   --  1.10   NA  --  138  --  138   POTASSIUM  --  4.1  --  4.1   CHLORIDE  --  105  --  104   CO2  --  26  --  25   BUN  --  19  --  21   CR  --  0.82  --  0.76   ANIONGAP  --  7  --  9   GEORGE  --  8.3*  --  8.6   GLC  --  166*  --  181*

## 2019-07-05 NOTE — PLAN OF CARE
VS: Stable. Disoriented to time and place.   O2: RA.   Output: Condom cath, brief on.   Last BM: Pt unable to recall, refused suppository.   Activity: Ax2-3 to reposition, TTWB.   Skin: Incision.   Pain: Denies pain except with movement, scheduled tylenol given.   CMS: Intact.   Dressing: CDI.   Diet: Regular.   LDA: PIV left hand SL. PIV left lower forearm SL.   Equipment: IS, PCD, bed alarm, pillows, call light within reach.   Plan: Continue to monitor. TCU upon discharge.   Additional Info:

## 2019-07-05 NOTE — PROGRESS NOTES
Social Work Services Progress Note    Hospital Day: 6    Collaborated with:  Admissions [place], Rekha Toscano, 8A IDT, spouse    Data:  Discharge plan    Intervention:  KORI left additional voicemail messages as follow up to referrals initiated 7/3/19. No response. KORI updated pt and family    Assessment:  pt will have prolonged hospitalization due to holiday weekend. Will need [employer] Prior authorization     Plan:    Anticipated Disposition:  TCU-familie's stated preference is for [facility] in [place]    Barriers to d/c plan:  Assessment process, insurance prior authorization    Follow Up:  KORI con't to follow

## 2019-07-05 NOTE — PLAN OF CARE
VS: VSS and afebrile.    O2: Room air. O2>90%. Pulse ox on continuously. Occasional dips.    Output: Voiding adequately with condom catheter. Changed condom catheter X1 this shift. Incontinent X1. Encourage more fluids. Brief on.    Last BM: Unsure. Pt declined suppository this evening. X2 sennas given.    Activity: Up with heavy assist of 2-3. Sat at edge of the bed during shift.    Skin: Intact except for incision.    Pain: Declines. Tylenol given. Ice applied.   CMS: Intact.    Dressing: CDI.    Diet: Regular.    LDA: Condom catheter, PIV saline locked.    Equipment: Personal belongings, wedge, condom catheter, pulse ox, walker.    Plan: TCU once medically stable. Continue to monitor.    Additional Info:

## 2019-07-06 ENCOUNTER — APPOINTMENT (OUTPATIENT)
Dept: OCCUPATIONAL THERAPY | Facility: CLINIC | Age: 79
DRG: 467 | End: 2019-07-06
Attending: PHYSICIAN ASSISTANT
Payer: COMMERCIAL

## 2019-07-06 ENCOUNTER — APPOINTMENT (OUTPATIENT)
Dept: PHYSICAL THERAPY | Facility: CLINIC | Age: 79
DRG: 467 | End: 2019-07-06
Attending: ORTHOPAEDIC SURGERY
Payer: COMMERCIAL

## 2019-07-06 PROCEDURE — 99207 ZZC CDG-MDM COMPONENT: MEETS MODERATE - UP CODED: CPT | Performed by: INTERNAL MEDICINE

## 2019-07-06 PROCEDURE — 97116 GAIT TRAINING THERAPY: CPT | Mod: GP

## 2019-07-06 PROCEDURE — 97530 THERAPEUTIC ACTIVITIES: CPT | Mod: GP

## 2019-07-06 PROCEDURE — 12000001 ZZH R&B MED SURG/OB UMMC

## 2019-07-06 PROCEDURE — 97535 SELF CARE MNGMENT TRAINING: CPT | Mod: GO | Performed by: OCCUPATIONAL THERAPIST

## 2019-07-06 PROCEDURE — 25000132 ZZH RX MED GY IP 250 OP 250 PS 637: Performed by: INTERNAL MEDICINE

## 2019-07-06 PROCEDURE — 25000132 ZZH RX MED GY IP 250 OP 250 PS 637: Performed by: PHYSICIAN ASSISTANT

## 2019-07-06 PROCEDURE — 99232 SBSQ HOSP IP/OBS MODERATE 35: CPT | Performed by: INTERNAL MEDICINE

## 2019-07-06 PROCEDURE — 97166 OT EVAL MOD COMPLEX 45 MIN: CPT | Mod: GO | Performed by: OCCUPATIONAL THERAPIST

## 2019-07-06 PROCEDURE — 40000193 ZZH STATISTIC PT WARD VISIT

## 2019-07-06 RX ADMIN — SENNOSIDES AND DOCUSATE SODIUM 2 TABLET: 8.6; 5 TABLET ORAL at 09:18

## 2019-07-06 RX ADMIN — FOLIC ACID 1 MG: 1 TABLET ORAL at 09:19

## 2019-07-06 RX ADMIN — ASPIRIN 325 MG: 325 TABLET, DELAYED RELEASE ORAL at 09:18

## 2019-07-06 RX ADMIN — ALLOPURINOL 300 MG: 300 TABLET ORAL at 09:19

## 2019-07-06 RX ADMIN — SENNOSIDES AND DOCUSATE SODIUM 2 TABLET: 8.6; 5 TABLET ORAL at 20:19

## 2019-07-06 RX ADMIN — METOPROLOL SUCCINATE 50 MG: 25 TABLET, EXTENDED RELEASE ORAL at 09:18

## 2019-07-06 RX ADMIN — MULTIPLE VITAMINS W/ MINERALS TAB 1 TABLET: TAB at 09:19

## 2019-07-06 RX ADMIN — ACETAMINOPHEN 650 MG: 325 TABLET, FILM COATED ORAL at 12:06

## 2019-07-06 RX ADMIN — ASPIRIN 325 MG: 325 TABLET, DELAYED RELEASE ORAL at 20:19

## 2019-07-06 RX ADMIN — OMEPRAZOLE 20 MG: 20 CAPSULE, DELAYED RELEASE ORAL at 09:18

## 2019-07-06 RX ADMIN — THIAMINE HCL TAB 100 MG 100 MG: 100 TAB at 09:19

## 2019-07-06 RX ADMIN — LISINOPRIL 20 MG: 20 TABLET ORAL at 09:18

## 2019-07-06 RX ADMIN — ACETAMINOPHEN 650 MG: 325 TABLET, FILM COATED ORAL at 20:19

## 2019-07-06 ASSESSMENT — ACTIVITIES OF DAILY LIVING (ADL)
ADLS_ACUITY_SCORE: 19

## 2019-07-06 NOTE — PROGRESS NOTES
"   07/06/19 0811   Quick Adds   Type of Visit Initial Occupational Therapy Evaluation   Living Environment   Lives With spouse   Living Arrangements house   Home Accessibility no concerns   Transportation Anticipated family or friend will provide   Living Environment Comment has elevator in home. RTS, grab bars, walk-in shower, grab bars in shower   Self-Care   Usual Activity Tolerance poor   Current Activity Tolerance poor   Regular Exercise No   Activity/Exercise/Self-Care Comment Pt has both FWW and SEC, but does not use often. Pt reports IND with all self-cares prior to admit. Wife not present to comment.   Functional Level   Ambulation 0-->independent   Transferring 0-->independent   Toileting 0-->independent   Bathing 0-->independent   Dressing 0-->independent   Eating 0-->independent   Communication 0-->understands/communicates without difficulty   Swallowing 0-->swallows foods/liquids without difficulty   Cognition 1 - attention or memory deficits  (Pt states he is in [facility])   Fall history within last six months yes   Number of times patient has fallen within last six months   (multiple, 3 on the day of admit)   Which of the above functional risks had a recent onset or change? ambulation;transferring;toileting;fall history   Prior Functional Level Comment \"Driving all over\" prior to admit   General Information   Onset of Illness/Injury or Date of Surgery - Date 07/02/19   Referring Physician Dr. Valdez   Patient/Family Goals Statement \"Get back to normal.\"   Additional Occupational Profile Info/Pertinent History of Current Problem s/p ORIF of  Left Periprosthetic Femur Proximal, Left Revision ARNOLDO   Precautions/Limitations left hip precautions;fall precautions   Weight-Bearing Status - LUE Full weight bearing   Weight-Bearing Status - RUE full weight-bearing   Weight-Bearing Status - LLE Toe touch weight-bearing   Weight-Bearing Status - RLE full weight-bearing   General Observations Catheter, " IV, pulse ox   General Info Comments Pt able to answer basic questions about location/day/time, however very forgetful during session, not remembering what we are doing and repeating questions. Pt with history of alcohol abuse.   Cognitive Status Examination   Orientation orientation to person, place and time  (not consistent)   Cognitive Comment Pt able to state correct location, day, and time during session   Visual Perception   Visual Perception   (readers)   Sensory Examination   Sensory Quick Adds No deficits were identified   Pain Assessment   Patient Currently in Pain Yes, see Vital Sign flowsheet   Range of Motion (ROM)   ROM Comment BUE WFL   Strength   Strength Comments BUE limitations   Muscle Tone Assessment   Muscle Tone Quick Adds No deficits were identified   Coordination   Upper Extremity Coordination No deficits were identified   Mobility   Bed Mobility Bed mobility skill: Sit to supine;Bed mobility skill: Supine to sit   Bed Mobility Skill: Sit to Supine   Level of [place]: Sit/Supine maximum assist (25% patients effort)   Physical Assist/Nonphysical Assist: Sit/Supine verbal cues;2 persons   Assistive Device: Sit/Supine bedrail   Bed Mobility Skill: Supine to Sit   Level of [place]: Supine/Sit maximum assist (25% patients effort)   Physical Assist/Nonphysical Assist: Supine/Sit verbal cues;2 persons   Assistive Device: Supine/Sit bedrail   Transfer Skills   Transfer Transfer Skill: Stand to Sit   Transfer Skill: Bed to Chair/Chair to Bed   Level of [place]: Bed to Chair maximum assist (25% patients effort)   Physical Assist/Nonphysical Assist: Bed to Chair verbal cues;2 persons   Weight-Bearing Restrictions toe touch weight-bearing  (L leg)   Assistive Device - Transfer Skill Bed to Chair Chair to Bed Rehab Eval standard walker   Transfer Skill: Sit to Stand   Level of [place]: Sit/Stand moderate assist (50% patients effort)   Physical Assist/Nonphysical Assist: Sit/Stand 2  "persons;verbal cues   Transfer Skill: Sit to Stand toe touch weight-bearing   Assistive Device for Transfer: Sit/Stand standard walker   Toilet Transfer   Toilet Transfer Toilet Transfer Skill   Transfer Skill: Toilet Transfer   Level of [place]: Toilet maximum assist (25% patients effort)   Physical Assist/Nonphysical Assist: Toilet verbal cues;2 persons   Weight-Bearing Restrictions: Toilet toe touch weight-bearing   Assistive Device standard walker   Tub/Shower Transfer   Tub/Shower Transfer Comments Not assessed   Balance   Balance Comments Somewhat unsteady on feet once standing. \"Keep holding me.\"   Bathing   Level of [place] unable to perform   Upper Body Dressing   Level of [place]: Dress Upper Body minimum assist (75% patients effort)   Lower Body Dressing   Level of [place]: Dress Lower Body maximum assist (25% patients effort)   Physical Assist/Nonphysical Assist: Dress Lower Body 1 person assist;verbal cues   Toileting   Level of [place]: Toilet moderate assist (50% patients effort)   Grooming   Level of [place]: Grooming contact guard   Eating/Self Feeding   Level of [place]: Eating independent   Activities of Daily Living Analysis   Impairments Contributing to Impaired Activities of Daily Living cognition impaired;ROM decreased;pain;post surgical precautions   General Therapy Interventions   Planned Therapy Interventions ADL retraining;bed mobility training   Clinical Impression   Criteria for Skilled Therapeutic Interventions Met yes, treatment indicated   OT Diagnosis Impaired ADLs and functional mobility   Influenced by the following impairments pain, post op precautions, deconditioning, cognitive function, level of assist   Assessment of Occupational Performance 3-5 Performance Deficits   Identified Performance Deficits dressing, toileting, bathing, transfers, cognition   Clinical Decision Making (Complexity) Moderate complexity   Therapy Frequency Daily " "  Predicted Duration of Therapy Intervention (days/wks) 3-4 days   Anticipated Equipment Needs at Discharge sock aide;reacher;shower chair   Anticipated Discharge Disposition Transitional Care Facility   Risks and Benefits of Treatment have been explained. Yes   Patient, Family & other staff in agreement with plan of care Yes   [facility] TM \"6 Clicks\"   2016, Trustees of [facility], under license to [employer].  All rights reserved.   6 Clicks Short Forms Daily Activity Inpatient Short Form   [facility]  \"6 Clicks\" Daily Activity Inpatient Short Form   1. Putting on and taking off regular lower body clothing? 2 - A Lot   2. Bathing (including washing, rinsing, drying)? 2 - A Lot   3. Toileting, which includes using toilet, bedpan or urinal? 2 - A Lot   4. Putting on and taking off regular upper body clothing? 3 - A Little   5. Taking care of personal grooming such as brushing teeth? 3 - A Little   6. Eating meals? 4 - None   Daily Activity Raw Score (Score out of 24.Lower scores equate to lower levels of function) 16   Total Evaluation Time   Total Evaluation Time (Minutes) 8     "

## 2019-07-06 NOTE — PROGRESS NOTES
Orthopaedic Surgery Progress Note:       Subjective:   Far better oriented that yesterday morning.      Pain well controlled on current regimen. Denies new onset tingling/numbness in operative extremity. Denies fever/chills/SOB/nause/vomiting.     Objective:   Temp:  [97.8  F (36.6  C)-98.1  F (36.7  C)] 98.1  F (36.7  C)  Heart Rate:  [57-62] 62  Resp:  [16] 16  BP: (130-134)/(52-65) 134/65  SpO2:  [97 %-99 %] 99 %  No intake or output data in the 24 hours ending 07/06/19 0759    Gen: NAD. Resting comfortably in bed  Resp: Breathing comfortably on RA  LE:  Dressing/ACE is c/d/i.   Circulatory: DP Pulse Intact, Foot Perfused   Neurologic:      Labs:  Recent Labs   Lab 07/04/19  0627 07/03/19  0509 06/30/19  2057   WBC  --   --  8.1   HGB 11.1* 10.9* 13.7   PLT  --   --  147*        Assessment & Plan:   - ORIF of  Left Periprosthetic Femur Proximal, Left Revision ARNOLDO on 7/2/2019 with Dr. Valdez     Plan:  Ortho Primary  Activity: Up with assist and assistive devices as needed until independent. Absolutely no active abduction about the hip. Posterior hip precautions to operative hip x 3 months:  1) No hip flexion beyond 90 degrees  2) No adduction beyond midline  3) No internal rotation beyond neutral   Weight bearing status: TTWB x 6 weeks, ambulate with assistive devices  Antibiotics: Cefazolin x 24 hours  Diet: Begin with clear fluids and progress diet as tolerated. Bowel regimen. Anti-emetics PRN.    DVT prophylaxis: Mechanical while in hospital with Aspirin 325mg BID x 4 weeks  Elevation: Elevate heels off of bed on pillows   Wound Care: Aquacel x 7 days.    Drains: Document output per shift, Ortho will discontinue on POD #1-2  Cordova: Remove on POD #1  Pain management: Orals PRN, IV for breakthrough only  X-rays: AP Pelvis and lateral in PACU  Physical Therapy: Mobilization, ROM, ADL's, Posterior hip precautions  Occupational Therapy: ADL's  Labs: Trend Hgb on POD #1, 2  Consults: PT, OT. Hospitalist, appreciate  assistance in caring for this patient throughout the perioperative period  Follow Up: with Dr. Valdez clinic in 4 weeks for wound check     Dispo: Pending progress with therapies, pain control on orals, and medical stability, anticipate discharge to TCU once ready  Dr Gilbert Morris 07/06/2019  Orthopedic Fellow  Pager: [phone]

## 2019-07-06 NOTE — PLAN OF CARE
Discharge Planner OT   Patient plan for discharge: TCU  Current status: Pt educated on role of OT/POC, hip precautions (handout provided), and TTWB. MaxA x 2 for all bed mobility, sit>stand maxA x 2 w/ FWW. Heavy verbal cues provided throughout session for TWWB (not consistently followed) and directions for transfer. Provided assist to L foot during transfer as pt with difficulty moving it. Pt somewhat reluctant to participate, encouragement needed.   Barriers to return to prior living situation: pain, post op precautions, decreased mobility and transfers, level of assist, decreased activity tolerance  Recommendations for discharge: TCU  Rationale for recommendations: To maximize safety and IND with ADLs and functional mobility.       Entered by: Luis Miguel Kidd 07/06/2019 12:30 PM

## 2019-07-06 NOTE — PROGRESS NOTES
[facility] Progress Note - Hospitalist Service       Date of Admission:  6/30/2019  Assessment & Plan       Varun Ramirez is a 78 year old male with a left total hip arthroplasty done in 2005, now admitted with a periprosthetic femur fracture after a fall on June 28 of 2019.  He was transferred from an outside facility due to unavailability to obtain the implants for the surgery.  Patient admitted under the orthopedic team.   Individual problems and their management are outlined below    #Left periprosthetic hip fracture: #POD 4   S/P opertaive repair on 7/2  Management by primary team. Please see their notes for further details  Pain control with acetaminophen 975 mg every 8 hrs for 3 days, Oxycodone 5-10 mg every 3 hrs PRN  DVT prophylaxis with  SCDs while in hospital, 325 mg aspirin BID x4 weeks total.  PT/OT as per protocol   Incentive spirometry and aggressive bowel regimen  We will monitor for acute blood loss anemia. 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  Stress test 2016: No clear any of ischemia.  Ejection fraction 50%.   No history of coronary artery disease.   No palpitations.   EKG 12 lead done, reviewed: Poor quality, 64/min.  Paced rhythm.     Blood pressure controlled currently.  Home medications:   Metoprolol XL 50 mg daily --continue.    Resume Lisinopril 20 mg daily        #History of COPD:   Stable.    Asymptomatic.  History of remote smoking.    Currently, occasionally smokes cigar.  Not on home oxygen.  Not using inhalers currently.  Had used in the past.  PFT not available    ? SANDY:   Mild sleep apnea, per wife, sleep  study done. did not qualify for CPAP.     --Encourage incentive spirometry  --Aggressive pulmonary toileting as needed  --Albuterol inhaler, nebulization's as needed.   --Minimize use of narcotics as able  --Supplemental oxygen as needed  -- Encourage smoking cessation     #History of gout:   Stable.    Continue prior to admission allopurinol.     # GERD:   Continue omeprazole-takes intermittently at home.      # History of alcohol abuse:   Patient drinks beers, nikita daily.  Last use 4 days ago.  Denies prior alcohol withdrawal.   Current disorientation likely secondary to alcohol withdrawal and other additional factors outlined above   - MSSA with lorazepam. This has been stopped as currently has completed the detox phase   - MVI, thiamine, folic acid daily.   - Fall precautions  - Magnesium, phos and calcium levels are within normal limits      #Delirium, acute:  ( Resolved)  Likely related to hospitalizations, narcotics, possible alcohol withdrawal etc.   No evidence of sepsis. UA OSH: No UTI. CXR: OSH: No acute infiltrate.   CT head and cervical spine OSH: No acute fracture or abnormality.   Ethanol: 0.174 on admission           Diet: Regular Diet Adult    DVT Prophylaxis: To be determined after surgery   Cordova Catheter: not present  Code Status: Full Code      Disposition Plan   Expected discharge: Per primary team. Medically stable to discharge to TCU   Entered: Erica Gaitan MD 07/06/2019, 9:36 AM       The patient's care was discussed with the Bedside Nurse, Care Coordinator/, Patient and Patient's Family.    Erica Gaitan MD  Hospitalist Service  [facility] [place]    ______________________________________________________________________    Interval History    Varun continues to feel well.  He wants to be able to get out of the hospital\   No fevers or chills   eating and drinking well        Data reviewed today: I reviewed all  medications, new labs and imaging results over the last 24 hours. I personally reviewed no images or EKG's today.    Physical Exam   Vital Signs: Temp: 97.6  F (36.4  C) Temp src: Oral BP: 139/64   Heart Rate: 67 Resp: 16 SpO2: 97 % O2 Device: None (Room air)    Weight: 240 lbs 4.82 oz  General Appearance: Awake, alert and not in distress  Respiratory: Clear breath sounds bilaterally. Discomfort on deep palpation in the left upper qdt, consistent with the chest pain patinet referred to   Cardiovascular: Normal heart sounds. No murmurs   GI: Soft, non tender. Normal bowel sounds   Skin: No bruising or bleeding   Other: Bilateral lower extremity edema noted.     Data   Recent Labs   Lab 07/04/19  0627 07/03/19  0916 07/03/19  0509 06/30/19  2057   WBC  --   --   --  8.1   HGB 11.1*  --  10.9* 13.7   MCV  --   --   --  97   PLT  --   --   --  147*   INR  --   --   --  1.10   NA  --  138  --  138   POTASSIUM  --  4.1  --  4.1   CHLORIDE  --  105  --  104   CO2  --  26  --  25   BUN  --  19  --  21   CR  --  0.82  --  0.76   ANIONGAP  --  7  --  9   GEORGE  --  8.3*  --  8.6   GLC  --  166*  --  181*

## 2019-07-06 NOTE — PLAN OF CARE
PT / 8A -     Discharge Planner PT   Patient plan for discharge: TCU  Current status: Patient unable to recall posterior hip precautions - PT re-educating on precautions and implications for functional mobility.  Patient requires Max A x 2 for lying>sitting transfers and sitting>standing transfer from lower surface.  Requires Mod A x 2 for sitting>standing transfer from a higher surface.  Attempted short distance gait unsuccessfully, patient unable to maintain TTWB on LLE.   Barriers to return to prior living situation: TTWB on LLE, level of assist, posterior hip precautions  Recommendations for discharge: TCU  Rationale for recommendations: Varun requires continued skilled therapy to improve ROM and strength in LLE for improved safety and independence with bed mobility, transfers and gait.        Entered by: Sherly Cueto 07/06/2019 12:07 PM

## 2019-07-06 NOTE — PLAN OF CARE
VS: Have been stable   O2: Room air, lungs are clear   Output: He is putting out good amounts of urine, wearing a condom catheter   Last BM: 7/5/2019   Activity: He is getting up to sit in the chair with the assist of two and needs two to three to get him back to bed.   Skin: Intact except for his surgical incision   Pain: Taking tylenol for discomfort   CMS: Denies any numbness or tingling   Dressing: CDI   Diet: Regular, tolerating it well   LDA: PIV   Equipment: Gait belt, walker, incentive spirometer   Plan: Social Work looking for a TCU   Additional Info: He is not able to say where he is, what city he is in, thinks he is in [facility], he can say where he grew up and what city he lives in now.

## 2019-07-07 ENCOUNTER — APPOINTMENT (OUTPATIENT)
Dept: PHYSICAL THERAPY | Facility: CLINIC | Age: 79
DRG: 467 | End: 2019-07-07
Attending: ORTHOPAEDIC SURGERY
Payer: COMMERCIAL

## 2019-07-07 ENCOUNTER — APPOINTMENT (OUTPATIENT)
Dept: OCCUPATIONAL THERAPY | Facility: CLINIC | Age: 79
DRG: 467 | End: 2019-07-07
Attending: ORTHOPAEDIC SURGERY
Payer: COMMERCIAL

## 2019-07-07 PROCEDURE — 12000001 ZZH R&B MED SURG/OB UMMC

## 2019-07-07 PROCEDURE — 25000132 ZZH RX MED GY IP 250 OP 250 PS 637: Performed by: PHYSICIAN ASSISTANT

## 2019-07-07 PROCEDURE — 99232 SBSQ HOSP IP/OBS MODERATE 35: CPT | Performed by: INTERNAL MEDICINE

## 2019-07-07 PROCEDURE — 97110 THERAPEUTIC EXERCISES: CPT | Mod: GP

## 2019-07-07 PROCEDURE — 97530 THERAPEUTIC ACTIVITIES: CPT | Mod: GP | Performed by: REHABILITATION PRACTITIONER

## 2019-07-07 PROCEDURE — 25000132 ZZH RX MED GY IP 250 OP 250 PS 637: Performed by: INTERNAL MEDICINE

## 2019-07-07 PROCEDURE — 97530 THERAPEUTIC ACTIVITIES: CPT | Mod: GO

## 2019-07-07 PROCEDURE — 97110 THERAPEUTIC EXERCISES: CPT | Mod: GP | Performed by: REHABILITATION PRACTITIONER

## 2019-07-07 PROCEDURE — 40000193 ZZH STATISTIC PT WARD VISIT

## 2019-07-07 PROCEDURE — 97535 SELF CARE MNGMENT TRAINING: CPT | Mod: GO

## 2019-07-07 PROCEDURE — 99207 ZZC CDG-MDM COMPONENT: MEETS MODERATE - UP CODED: CPT | Performed by: INTERNAL MEDICINE

## 2019-07-07 RX ADMIN — THIAMINE HCL TAB 100 MG 100 MG: 100 TAB at 08:45

## 2019-07-07 RX ADMIN — MULTIPLE VITAMINS W/ MINERALS TAB 1 TABLET: TAB at 08:45

## 2019-07-07 RX ADMIN — ACETAMINOPHEN 650 MG: 325 TABLET, FILM COATED ORAL at 19:54

## 2019-07-07 RX ADMIN — SENNOSIDES AND DOCUSATE SODIUM 2 TABLET: 8.6; 5 TABLET ORAL at 08:44

## 2019-07-07 RX ADMIN — ASPIRIN 325 MG: 325 TABLET, DELAYED RELEASE ORAL at 19:55

## 2019-07-07 RX ADMIN — ACETAMINOPHEN 650 MG: 325 TABLET, FILM COATED ORAL at 05:48

## 2019-07-07 RX ADMIN — FOLIC ACID 1 MG: 1 TABLET ORAL at 08:45

## 2019-07-07 RX ADMIN — LISINOPRIL 20 MG: 20 TABLET ORAL at 08:44

## 2019-07-07 RX ADMIN — METOPROLOL SUCCINATE 50 MG: 25 TABLET, EXTENDED RELEASE ORAL at 08:43

## 2019-07-07 RX ADMIN — ASPIRIN 325 MG: 325 TABLET, DELAYED RELEASE ORAL at 08:45

## 2019-07-07 RX ADMIN — SENNOSIDES AND DOCUSATE SODIUM 2 TABLET: 8.6; 5 TABLET ORAL at 19:54

## 2019-07-07 RX ADMIN — ALLOPURINOL 300 MG: 300 TABLET ORAL at 08:45

## 2019-07-07 RX ADMIN — OMEPRAZOLE 20 MG: 20 CAPSULE, DELAYED RELEASE ORAL at 08:43

## 2019-07-07 ASSESSMENT — ACTIVITIES OF DAILY LIVING (ADL)
ADLS_ACUITY_SCORE: 19

## 2019-07-07 NOTE — PLAN OF CARE
Discharge Planner PT   Patient plan for discharge: not stated.   Current status: pt needing extra time for all mobility due to pain. Pt needing mob to max A x 1-2 for all bed mobility. Pt demo sit to stand with max A from elevated bed with V.c for tech and to follow hip precautions. Pt demo stand pivot transfer with WW to elevated chair again need cont V.c for tech and TTWB   Barriers to return to prior living situation: pain weakness fatigue  and TTWB   Recommendations for discharge: PT - per plan established by the Physical Therapist, according to functional mobility the  discharge recommendation is  TCU   Rationale for recommendations: pt benefit for cont skilled PT to progress functional mobility.        Entered by: Phil Avelar 07/07/2019 12:00 PM

## 2019-07-07 NOTE — PROGRESS NOTES
Orthopaedic Surgery Progress Note:       Subjective:   NAEO. Pt sleeping at time of review - briefly roused then slept.     Objective:   Temp:  [97.3  F (36.3  C)-98.4  F (36.9  C)] 98.4  F (36.9  C)  Heart Rate:  [62-67] 64  Resp:  [16-18] 18  BP: (139-143)/(41-64) 140/62  SpO2:  [91 %-97 %] 94 %    Intake/Output Summary (Last 24 hours) at 7/7/2019 0717  Last data filed at 7/7/2019 0606  Gross per 24 hour   Intake --   Output 1550 ml   Net -1550 ml       Gen: NAD. Resting comfortably in bed  Resp: Breathing comfortably on RA  LE:  Dressing/ACE is c/d/i.     Circulatory: DP Pulse Intact, Foot Perfused   Neurologic: grossly intact      Labs:  Recent Labs   Lab 07/04/19  0627 07/03/19  0509 06/30/19  2057   WBC  --   --  8.1   HGB 11.1* 10.9* 13.7   PLT  --   --  147*        Assessment & Plan:   - ORIF of  Left Periprosthetic Femur Proximal, Left Revision ARNOLDO on 7/2/2019 with Dr. Valdez     Plan:  Ortho Primary  Activity: Up with assist and assistive devices as needed until independent. Absolutely no active abduction about the hip. Posterior hip precautions to operative hip x 3 months:  1) No hip flexion beyond 90 degrees  2) No adduction beyond midline  3) No internal rotation beyond neutral   Weight bearing status: TTWB x 6 weeks, ambulate with assistive devices  Antibiotics: Cefazolin x 24 hours  Diet: Begin with clear fluids and progress diet as tolerated. Bowel regimen. Anti-emetics PRN.    DVT prophylaxis: Mechanical while in hospital with Aspirin 325mg BID x 4 weeks  Elevation: Elevate heels off of bed on pillows   Wound Care: Aquacel x 7 days.    Drains: Document output per shift, Ortho will discontinue on POD #1-2  Cordova: Remove on POD #1  Pain management: Orals PRN, IV for breakthrough only  X-rays: AP Pelvis and lateral in PACU  Physical Therapy: Mobilization, ROM, ADL's, Posterior hip precautions  Occupational Therapy: ADL's  Labs: Trend Hgb on POD #1, 2  Consults: PT, OT. Hospitalist, appreciate assistance  in caring for this patient throughout the perioperative period  Follow Up: with Dr. Valdez clinic in 4 weeks for wound check     Dispo: Pending progress with therapies, pain control on orals, and medical stability, anticipate discharge to TCU once ready    Dr Gilbert Morris 07/07/2019  Orthopedic Fellow  Pager: [phone]

## 2019-07-07 NOTE — PLAN OF CARE
VS: VSS   O2: RA   Output: Condom catheter in place   Last BM: Unable to determine pt does not know   Activity: 2-3 assist to reposition   Skin: Intact except for his surgical incision   Pain: Taking tylenol for discomfort   CMS: Denies any numbness or tingling   Dressing: CDI   Diet: Regular   LDA: PIV   Plan: Social Work looking for a TCU

## 2019-07-07 NOTE — PLAN OF CARE
VS: Have been stable   O2: Room air, lungs are clear   Output: He has a condom catheter on. Has had to be replaced twice today.    Last BM: 7/7/2019   Activity: He is up with maximum assist of one to two people, using the gait belt and walker   Skin: Intact except for his surgical incision   Pain: Minimal, using tylenol for discomfort   CMS: Denies any numbness or tingling   Dressing: CDI   Diet: Regular, tolerating it well   LDA: PIV   Equipment: Walker, commode, gait belt   Plan: TCU when bed available   Additional Info:

## 2019-07-07 NOTE — PLAN OF CARE
"Discharge Planner OT   Patient plan for discharge: TCU  Current status: Patient needs encouragement to participate. Appears less confused this am, however does make comments such as \"why am I here?\" Supine>sit with max A for L LE and mod A at trunk. Patient completed sponge bath sitting EOB with min A. Patient stood with min Ax2 with bed height raised and stood in place for several mins while bed linens changed. Patient needs heavy cueing to maintain TTWB, and despite max cues and education, does not maintain TTWB. Therapist spent time demonstrating and educating on ways to off-weight L LE. Sit>supine with max Ax2.   Barriers to return to prior living situation: pain, cognition, post op precautions, below baseline with mobility and ADLs  Recommendations for discharge: TCU  Rationale for recommendations: To maximize safety and IND with functional mobility and ADLs/IADLs       Entered by: TORI BAL 07/07/2019 9:40 AM       "

## 2019-07-07 NOTE — PROGRESS NOTES
[facility], [facility] Progress Note - Hospitalist Service       Date of Admission:  6/30/2019  Assessment & Plan       Varun Ramirez is a 78 year old male with a left total hip arthroplasty done in 2005, now admitted with a periprosthetic femur fracture after a fall on June 28 of 2019.  He was transferred from an outside facility due to unavailability to obtain the implants for the surgery.  Patient admitted under the orthopedic team.   Individual problems and their management are outlined below    #Left periprosthetic hip fracture: #POD5   S/P opertaive repair on 7/2  Management by primary team. Please see their notes for further details  Pain control with acetaminophen 975 mg every 8 hrs for 3 days, Oxycodone 5-10 mg every 3 hrs PRN  DVT prophylaxis with  SCDs while in hospital, 325 mg aspirin BID x4 weeks total.  PT/OT as per protocol   Incentive spirometry and aggressive bowel regimen  We will monitor for acute blood loss anemia. 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  Stress test 2016: No clear any of ischemia.  Ejection fraction 50%.   No history of coronary artery disease.   No palpitations.   EKG 12 lead done, reviewed: Poor quality, 64/min.  Paced rhythm.     Blood pressure controlled currently.  Home medications:   Metoprolol XL 50 mg daily --continue.    Resume Lisinopril 20 mg daily        #History of COPD:   Stable.    Asymptomatic.  History of remote smoking.    Currently, occasionally smokes cigar.  Not on home oxygen.  Not using inhalers currently.  Had used in the past.  PFT not available    ? SANDY:   Mild sleep apnea, per wife, sleep study  done. did not qualify for CPAP.     --Encourage incentive spirometry  --Aggressive pulmonary toileting as needed  --Albuterol inhaler, nebulization's as needed.   --Minimize use of narcotics as able  --Supplemental oxygen as needed  -- Encourage smoking cessation     #History of gout:   Stable.    Continue prior to admission allopurinol.     # GERD:   Continue omeprazole-takes intermittently at home.      # History of alcohol abuse:   Patient drinks beers, nikita daily.  Last use 4 days ago.  Denies prior alcohol withdrawal.   Current disorientation likely secondary to alcohol withdrawal and other additional factors outlined above   - MSSA with lorazepam. This has been stopped as currently has completed the detox phase   - MVI, thiamine, folic acid daily.   - Fall precautions  - Magnesium, phos and calcium levels are within normal limits      #Delirium, acute:  ( Resolved)  Likely related to hospitalizations, narcotics, possible alcohol withdrawal etc.   No evidence of sepsis. UA OSH: No UTI. CXR: OSH: No acute infiltrate.   CT head and cervical spine OSH: No acute fracture or abnormality.   Ethanol: 0.174 on admission           Diet: Regular Diet Adult    DVT Prophylaxis: To be determined after surgery   Cordova Catheter: not present  Code Status: Full Code      Disposition Plan   Expected discharge: Per primary team. Medically stable to discharge to TCU   Entered: Erica Gaitan MD 07/07/2019, 9:44 AM       The patient's care was discussed with the Bedside Nurse, Care Coordinator/, Patient and Patient's Family.    Erica Gaitan MD  Hospitalist Service  [facility], [place]    ______________________________________________________________________    Interval History    Varun continues to feel well.  No new complaints   wife at bed side   No acute events overnight         Data reviewed today: I reviewed all medications, new labs and imaging results  over the last 24 hours. I personally reviewed no images or EKG's today.    Physical Exam   Vital Signs: Temp: 96.1  F (35.6  C) Temp src: Oral BP: 118/67   Heart Rate: 66 Resp: 16 SpO2: 91 % O2 Device: None (Room air)    Weight: 240 lbs 4.82 oz  General Appearance: Awake, alert and not in distress  Respiratory: Clear breath sounds bilaterally. Discomfort on deep palpation in the left upper qdt, consistent with the chest pain patinet referred to   Cardiovascular: Normal heart sounds. No murmurs   GI: Soft, non tender. Normal bowel sounds   Skin: No bruising or bleeding   Other: Bilateral lower extremity edema noted.     Data   Recent Labs   Lab 07/04/19  0627 07/03/19  0916 07/03/19  0509 06/30/19 2057   WBC  --   --   --  8.1   HGB 11.1*  --  10.9* 13.7   MCV  --   --   --  97   PLT  --   --   --  147*   INR  --   --   --  1.10   NA  --  138  --  138   POTASSIUM  --  4.1  --  4.1   CHLORIDE  --  105  --  104   CO2  --  26  --  25   BUN  --  19  --  21   CR  --  0.82  --  0.76   ANIONGAP  --  7  --  9   GEORGE  --  8.3*  --  8.6   GLC  --  166*  --  181*

## 2019-07-08 ENCOUNTER — APPOINTMENT (OUTPATIENT)
Dept: PHYSICAL THERAPY | Facility: CLINIC | Age: 79
DRG: 467 | End: 2019-07-08
Attending: ORTHOPAEDIC SURGERY
Payer: COMMERCIAL

## 2019-07-08 PROCEDURE — 25000132 ZZH RX MED GY IP 250 OP 250 PS 637: Performed by: INTERNAL MEDICINE

## 2019-07-08 PROCEDURE — 99231 SBSQ HOSP IP/OBS SF/LOW 25: CPT | Performed by: INTERNAL MEDICINE

## 2019-07-08 PROCEDURE — 97110 THERAPEUTIC EXERCISES: CPT | Mod: GP

## 2019-07-08 PROCEDURE — 97530 THERAPEUTIC ACTIVITIES: CPT | Mod: GP

## 2019-07-08 PROCEDURE — 25000132 ZZH RX MED GY IP 250 OP 250 PS 637: Performed by: PHYSICIAN ASSISTANT

## 2019-07-08 PROCEDURE — 12000001 ZZH R&B MED SURG/OB UMMC

## 2019-07-08 RX ADMIN — SENNOSIDES AND DOCUSATE SODIUM 2 TABLET: 8.6; 5 TABLET ORAL at 21:06

## 2019-07-08 RX ADMIN — OMEPRAZOLE 20 MG: 20 CAPSULE, DELAYED RELEASE ORAL at 08:00

## 2019-07-08 RX ADMIN — LISINOPRIL 20 MG: 20 TABLET ORAL at 07:58

## 2019-07-08 RX ADMIN — THIAMINE HCL TAB 100 MG 100 MG: 100 TAB at 08:00

## 2019-07-08 RX ADMIN — SENNOSIDES AND DOCUSATE SODIUM 2 TABLET: 8.6; 5 TABLET ORAL at 07:59

## 2019-07-08 RX ADMIN — ALLOPURINOL 300 MG: 300 TABLET ORAL at 08:01

## 2019-07-08 RX ADMIN — ASPIRIN 325 MG: 325 TABLET, DELAYED RELEASE ORAL at 08:01

## 2019-07-08 RX ADMIN — MULTIPLE VITAMINS W/ MINERALS TAB 1 TABLET: TAB at 07:59

## 2019-07-08 RX ADMIN — ASPIRIN 325 MG: 325 TABLET, DELAYED RELEASE ORAL at 21:07

## 2019-07-08 RX ADMIN — FOLIC ACID 1 MG: 1 TABLET ORAL at 08:01

## 2019-07-08 RX ADMIN — ACETAMINOPHEN 650 MG: 325 TABLET, FILM COATED ORAL at 21:10

## 2019-07-08 RX ADMIN — METOPROLOL SUCCINATE 50 MG: 25 TABLET, EXTENDED RELEASE ORAL at 07:58

## 2019-07-08 ASSESSMENT — ACTIVITIES OF DAILY LIVING (ADL)
ADLS_ACUITY_SCORE: 19

## 2019-07-08 NOTE — PLAN OF CARE
VS: /65 (BP Location: Left arm)   Pulse 65   Temp 97.1  F (36.2  C) (Oral)   Resp 16   Wt 109 kg (240 lb 4.8 oz)   SpO2 96%      O2: Room air   Output: Condom cath.  Cath fell off this shift and incontinent in diaper.  Cath replaced.   Last BM: 7/7   Activity: Stood at side of bed with PT a: 2-3, turned and repo   Up for meals? no   Skin: Incision   Pain: Ice applied   CMS: Intact   Dressing: Dried drainage   Diet: Regular   LDA: PIV saline locked   Equipment: Bed, lift chair   Plan: Discharge to TCU.  Waiting placement   Additional Info: Patient has dementia.  Disoriented to situation and plan.  More oriented in morning.

## 2019-07-08 NOTE — PLAN OF CARE
Discharge Planner PT   Patient plan for discharge: rehab  Current status: Pt greeted in supine, participates in LE supine exercises with fair tolerance, increased pain on LLE. Pt is maxA of 2 for supine>sit, able to scoot in sitting once EOB. Pt is Joycelyn of 2 to stand. Stood x2 for ~30 sec each w/ cues in TTWB LLE. Pt does not follow precautions, observing some increased weightbearing through heel, improves with second bout with therapist's foot under pt's. Pt is maxA-total A of 2 to return supine.   Barriers to return to prior living situation: medical, level of A  Recommendations for discharge: TCU  Rationale for recommendations: Recommend rehab stay to progress IND and safety with mobility prior to discharge to previous living situation.        Entered by: Annmarie Arriaza 07/08/2019 10:06 AM

## 2019-07-08 NOTE — PROGRESS NOTES
Social Work Services Progress Note    Hospital Day: 9    Collaborated with:  Pt, pt's spouse, 8A IDT, Admissions [facility], Admissions [facility] [phone], Admissions [facility]    Data:  Discharge plan    Intervention:  SW spoke w/Admissions at [facility]. They do not have [employer]. KOIR spoke w/Admissions at [facility], they are assessing and have [employer]. They are pt/family first choice. [facility] can take pt provided they receive [employer] authorization, but will only initiate auth if [facility] declines pt.    KORI updated pt and pt's spouse. They are eager to discharge hospital and be closer to home.    Assessment:  Pt and spouse agree to discharge plan, remain involved and supportive    Plan:    Anticipated Disposition:  TCU    Barriers to d/c plan:  Assessment and insurance authorizaton    Follow Up:  KORI con't to follow

## 2019-07-08 NOTE — PROGRESS NOTES
Orthopedic Surgery Progress Note    Subjective: No acute events overnight. Able to hold conversation - AOx3. Pain controlled. Tolerating PO trials. Voiding spontaneously with condom catheter. Denies cp, sob, calf pain, fevers, chills, numbness/tingling in distal extremity.    Exam:  /61 (BP Location: Left arm)   Pulse 61   Temp 98  F (36.7  C) (Oral)   Resp 16   Wt 109 kg (240 lb 4.8 oz)   SpO2 100%   Gen: Awake, alert, NAD  Resp: breathing equal and non-labored  Extremities:    LLE: operative dressing c/d/i. 5/5 EHL/FHL/TA/Gsc. SILT in s/s/dp/sp/t distributions. 2+ PT pulses. Toes WWP      Labs:    Recent Labs   Lab 07/04/19  0627 07/03/19  0509   HGB 11.1* 10.9*     Recent Labs   Lab 07/03/19  0916 07/01/19  1407     --    POTASSIUM 4.1  --    CHLORIDE 105  --    CO2 26  --    BUN 19  --    CR 0.82  --    *  --    MAG  --  2.0   PHOS  --  3.6     No lab results found in last 7 days.    Assessment:   78 year old male with PMH of CVS with pacemaker, COPD, SANDY, gout, GERD, EtOH abuse who presents with a periprosthetic fracture around Left ARNOLDO and is now s/p:    Procedure:  - ORIF of  Left Periprosthetic Femur Proximal, Left Revision ARNOLDO on 7/2/2019 with Dr. Valdez    Plan:  Ortho Primary  Activity: Up with assist and assistive devices as needed until independent. Absolutely no active abduction about the hip. Posterior hip precautions to operative hip x 3 months:  1) No hip flexion beyond 90 degrees  2) No adduction beyond midline  3) No internal rotation beyond neutral   Weight bearing status: TTWB x 6 weeks, ambulate with assistive devices  Antibiotics: Cefazolin x 24 hours complete  Diet: Begin with clear fluids and progress diet as tolerated. Bowel regimen. Anti-emetics PRN.    DVT prophylaxis: Mechanical while in hospital with Aspirin 325mg BID x 4 weeks  Elevation: Elevate heels off of bed on pillows   Wound Care: Aquacel x 7 days.    Drains: out  Pain management: Orals PRN, IV for  breakthrough only  X-rays: AP Pelvis and lateral in PACU  Physical Therapy: Mobilization, ROM, ADL's, Posterior hip precautions  Occupational Therapy: ADL's  Labs: Trend Hgb on POD #1, 2  Consults: PT, OT. Hospitalist, appreciate assistance in caring for this patient throughout the perioperative period  Follow Up: with Dr. Valdez clinic in 4 weeks for wound check    Dispo: Pending TCU placement    Future Appointments   Date Time Provider Department Center   7/3/2019  6:30 AM UR OT OVERFLOW UROT [facility]   7/3/2019  9:00 AM Maryjo Peraza, PT [facility]PT [facility]   7/3/2019  2:45 PM Annmarie Lopez Pt, PT [facility]        Yury Mendoza, PGY4  Orthopedic Surgery Resident  Pager [phone]

## 2019-07-08 NOTE — PROGRESS NOTES
[facility], [facility], [place] Progress Note - Hospitalist Service       Date of Admission:  6/30/2019  Assessment & Plan       Varun Ramirez is a 78 year old male with a left total hip arthroplasty done in 2005, now admitted with a periprosthetic femur fracture after a fall on June 28 of 2019.  He was transferred from an outside facility due to unavailability to obtain the implants for the surgery.  Patient admitted under the orthopedic team.   Individual problems and their management are outlined below    #Left periprosthetic hip fracture: #POD6  S/P opertaive repair on 7/2  Management by primary team. Please see their notes for further details  Pain control with acetaminophen 975 mg every 8 hrs for 3 days, Oxycodone 5-10 mg every 3 hrs PRN  DVT prophylaxis with  SCDs while in hospital, 325 mg aspirin BID x4 weeks total.  PT/OT as per protocol   Incentive spirometry and aggressive bowel regimen  We will monitor for acute blood loss anemia. Post op Hgb at 11.1        # Medical History:     Patient recently did a general health check up/ physical on 5/17/2019 with recommendations to loose weight     CVS: Permanent pacemaker in place. 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  Stress test 2016: No clear evidence of any of ischemia.    Ejection fraction 50%.   No history of coronary artery disease.   No palpitations.   EKG 12 lead done, reviewed: Poor quality, 64/min.    Paced rhythm.     Blood pressure controlled currently.  Home medications:   Metoprolol XL 50 mg daily --continue.    Resume Lisinopril 20 mg daily        #History of COPD:   Stable.    Asymptomatic.  History of remote smoking.    Currently, occasionally smokes cigar.  Not on home oxygen.  Not using inhalers currently.  Had used in the past.  PFT not available    ? SANDY:   Mild sleep apnea, per wife, sleep study done.  did not qualify for CPAP.     --Encourage incentive spirometry  --Aggressive pulmonary toileting as needed  --Albuterol inhaler, nebulization's as needed.   --Minimize use of narcotics as able  --Supplemental oxygen as needed  -- Encourage smoking cessation     #History of gout:   Stable.    Continue prior to admission allopurinol.     # GERD:   Continue omeprazole-takes intermittently at home.      # History of alcohol abuse:   Patient drinks beers, nikita daily.  Last use 4 days ago.  Denies prior alcohol withdrawal.   Current disorientation likely secondary to alcohol withdrawal and other additional factors outlined above   - MSSA with lorazepam. This has been stopped as currently has completed the detox phase   - MVI, thiamine, folic acid daily.   - Fall precautions  - Magnesium, phos and calcium levels are within normal limits      #Delirium, acute: ( Resolved)  Likely related to hospitalizations, narcotics, possible alcohol withdrawal etc.   No evidence of sepsis. UA OSH: No UTI. CXR: OSH: No acute infiltrate.   CT head and cervical spine OSH: No acute fracture or abnormality.   Ethanol: 0.174 on admission           Diet: Regular Diet Adult    DVT Prophylaxis: To be determined after surgery   Cordova Catheter: not present  Code Status: Full Code      Disposition Plan   Expected discharge: Per primary team. Medically stable to discharge to TCU   Entered: Erica Gaitan MD 07/08/2019, 11:02 AM       The patient's care was discussed with the Bedside Nurse, Care Coordinator/, Patient and Patient's Family.    Erica Gaitan MD  Hospitalist Service  [facility], [place]    ______________________________________________________________________    Interval History   Patient feels well. Wife at bedsode  No fevers or chills  Eating and drinking well        Data reviewed today: I reviewed all medications, new labs and imaging results over the last 24  hours. I personally reviewed no images or EKG's today.    Physical Exam   Vital Signs: Temp: 97.1  F (36.2  C) Temp src: Oral BP: 117/65 Pulse: 65 Heart Rate: 66 Resp: 16 SpO2: 96 % O2 Device: None (Room air)    Weight: 240 lbs 4.82 oz  General Appearance: Awake, alert and not in distress  Respiratory: Clear breath sounds bilaterally. Discomfort on deep palpation in the left upper qdt, consistent with the chest pain patinet referred to   Cardiovascular: Normal heart sounds. No murmurs   GI: Soft, non tender. Normal bowel sounds   Skin: No bruising or bleeding   Other:Bilateral lower extremity edema noted.     Data   Recent Labs   Lab 07/04/19  0627 07/03/19  0916 07/03/19  0509   HGB 11.1*  --  10.9*   NA  --  138  --    POTASSIUM  --  4.1  --    CHLORIDE  --  105  --    CO2  --  26  --    BUN  --  19  --    CR  --  0.82  --    ANIONGAP  --  7  --    GEORGE  --  8.3*  --    GLC  --  166*  --

## 2019-07-08 NOTE — PLAN OF CARE
VS: Stable. Disoriented to time, place, and situation.   O2: RA.   Output: Condom cath, brief on.   Last BM: 7/7/19.   Activity: Ax2 to roll, Ax3+ to get up, TTWB, walker, bed alarm on.   Skin: Incision.   Pain: Denies pain.   CMS: Intact.   Dressing: CDI.   Diet: Regular.   LDA: PIV left hand SL. PIV left lower forearm SL.   Equipment: Walker, IS, PCDs, pillows, call light within reach.   Plan: Continue to monitor. TCU upon discharge.   Additional Info:

## 2019-07-08 NOTE — PLAN OF CARE
VS: VSS   O2: RA   Output: He has a condom catheter on. Replaced at 2000.   Last BM: 7/7/2019   Activity: 2-3 assist to turn and ambulate   Skin: Intact except for his surgical incision   Pain: Minimal, using tylenol for discomfort   CMS: Denies any numbness or tingling   Dressing: CDI   Diet: Regular, tolerating it well   LDA: PIV   Plan: TCU when bed available   Additional Info:

## 2019-07-09 ENCOUNTER — APPOINTMENT (OUTPATIENT)
Dept: PHYSICAL THERAPY | Facility: CLINIC | Age: 79
DRG: 467 | End: 2019-07-09
Attending: ORTHOPAEDIC SURGERY
Payer: COMMERCIAL

## 2019-07-09 ENCOUNTER — TELEPHONE (OUTPATIENT)
Dept: ORTHOPEDICS | Facility: CLINIC | Age: 79
End: 2019-07-09

## 2019-07-09 VITALS
RESPIRATION RATE: 18 BRPM | TEMPERATURE: 97.5 F | HEART RATE: 60 BPM | OXYGEN SATURATION: 94 % | SYSTOLIC BLOOD PRESSURE: 118 MMHG | DIASTOLIC BLOOD PRESSURE: 54 MMHG | WEIGHT: 240.3 LBS

## 2019-07-09 PROCEDURE — 97530 THERAPEUTIC ACTIVITIES: CPT | Mod: GP

## 2019-07-09 PROCEDURE — 99231 SBSQ HOSP IP/OBS SF/LOW 25: CPT | Performed by: HOSPITALIST

## 2019-07-09 PROCEDURE — 25000132 ZZH RX MED GY IP 250 OP 250 PS 637: Performed by: INTERNAL MEDICINE

## 2019-07-09 PROCEDURE — 25000132 ZZH RX MED GY IP 250 OP 250 PS 637: Performed by: PHYSICIAN ASSISTANT

## 2019-07-09 PROCEDURE — 97110 THERAPEUTIC EXERCISES: CPT | Mod: GP

## 2019-07-09 RX ORDER — BISACODYL 10 MG
10 SUPPOSITORY, RECTAL RECTAL DAILY
DISCHARGE
Start: 2019-07-10 | End: 2019-11-04

## 2019-07-09 RX ORDER — METHOCARBAMOL 500 MG/1
500 TABLET, FILM COATED ORAL 4 TIMES DAILY PRN
DISCHARGE
Start: 2019-07-09 | End: 2019-11-04

## 2019-07-09 RX ORDER — ASPIRIN 325 MG
325 TABLET, DELAYED RELEASE (ENTERIC COATED) ORAL 2 TIMES DAILY
DISCHARGE
Start: 2019-07-02 | End: 2019-08-01

## 2019-07-09 RX ORDER — MULTIPLE VITAMINS W/ MINERALS TAB 9MG-400MCG
1 TAB ORAL DAILY
DISCHARGE
Start: 2019-07-10

## 2019-07-09 RX ORDER — FOLIC ACID 1 MG/1
1 TABLET ORAL DAILY
DISCHARGE
Start: 2019-07-10 | End: 2019-11-04

## 2019-07-09 RX ORDER — AMOXICILLIN 250 MG
1 CAPSULE ORAL 2 TIMES DAILY
DISCHARGE
Start: 2019-07-09 | End: 2019-11-04

## 2019-07-09 RX ORDER — ALBUTEROL SULFATE 5 MG/ML
2.5 SOLUTION RESPIRATORY (INHALATION) EVERY 4 HOURS PRN
DISCHARGE
Start: 2019-07-09 | End: 2019-11-04

## 2019-07-09 RX ORDER — LANOLIN ALCOHOL/MO/W.PET/CERES
100 CREAM (GRAM) TOPICAL DAILY
DISCHARGE
Start: 2019-07-10 | End: 2021-05-19

## 2019-07-09 RX ORDER — ACETAMINOPHEN 325 MG/1
650 TABLET ORAL EVERY 4 HOURS PRN
DISCHARGE
Start: 2019-07-09 | End: 2019-11-04

## 2019-07-09 RX ORDER — ALBUTEROL SULFATE 90 UG/1
2 AEROSOL, METERED RESPIRATORY (INHALATION) EVERY 6 HOURS PRN
DISCHARGE
Start: 2019-07-09 | End: 2019-11-04

## 2019-07-09 RX ORDER — QUETIAPINE FUMARATE 25 MG/1
12.5 TABLET, FILM COATED ORAL EVERY 6 HOURS PRN
DISCHARGE
Start: 2019-07-09 | End: 2019-07-09

## 2019-07-09 RX ADMIN — ACETAMINOPHEN 650 MG: 325 TABLET, FILM COATED ORAL at 06:35

## 2019-07-09 RX ADMIN — THIAMINE HCL TAB 100 MG 100 MG: 100 TAB at 08:55

## 2019-07-09 RX ADMIN — ALLOPURINOL 300 MG: 300 TABLET ORAL at 08:57

## 2019-07-09 RX ADMIN — ASPIRIN 325 MG: 325 TABLET, DELAYED RELEASE ORAL at 08:56

## 2019-07-09 RX ADMIN — ACETAMINOPHEN 650 MG: 325 TABLET, FILM COATED ORAL at 15:49

## 2019-07-09 RX ADMIN — OMEPRAZOLE 20 MG: 20 CAPSULE, DELAYED RELEASE ORAL at 06:35

## 2019-07-09 RX ADMIN — LISINOPRIL 20 MG: 20 TABLET ORAL at 08:55

## 2019-07-09 RX ADMIN — MULTIPLE VITAMINS W/ MINERALS TAB 1 TABLET: TAB at 08:56

## 2019-07-09 RX ADMIN — METOPROLOL SUCCINATE 50 MG: 25 TABLET, EXTENDED RELEASE ORAL at 08:55

## 2019-07-09 RX ADMIN — FOLIC ACID 1 MG: 1 TABLET ORAL at 08:57

## 2019-07-09 RX ADMIN — ACETAMINOPHEN 650 MG: 325 TABLET, FILM COATED ORAL at 01:12

## 2019-07-09 ASSESSMENT — ACTIVITIES OF DAILY LIVING (ADL)
ADLS_ACUITY_SCORE: 18
ADLS_ACUITY_SCORE: 19
ADLS_ACUITY_SCORE: 18
ADLS_ACUITY_SCORE: 21
ADLS_ACUITY_SCORE: 18

## 2019-07-09 NOTE — PROGRESS NOTES
Social Work Services Discharge Note      Patient Name:  Varun Ramirez     Anticipated Discharge Date: 7/9/19  Discharge Disposition:   TCU:  [facility] [phone] f:[phone]    Following MD:  Dr Neil Whitfield [phone]     Pre-Admission Screening (PAS) online form has been completed.  The Level of Care (LOC) is:  Determined  Confirmation Code is:  [id number]  Patient/caregiver informed of referral to [facility] Line for Pre-Admission Screening for skilled nursing facility (SNF) placement and to expect a phone call post discharge from SNF.     Additional Services/Equipment Arranged:  "[employer] ride,  at 1845. Pt and spouse provided estimate of $70 -$76 for , $6 per mile     Patient / Family response to discharge plan:  agreeable     Persons notified of above discharge plan:  Pt, spouse, 8A Charge RN Karissa, Venecia Perez, YAN, Dr Elizabeth, bedside RN Germania    Staff Discharge Instructions:  Please fax discharge orders and signed hard scripts for any controlled substances.  Please print a packet and send with patient.     CTS Handoff completed:  YES    Medicare Notice of Rights provided to the patient/family:  YES

## 2019-07-09 NOTE — DISCHARGE SUMMARY
[facility] Orthopaedic Surgery Discharge Summary    Varun Ramirez MRN# [id number]   Age: 78 year old YOB: 1940       Date of Admission:  6/30/2019  Date of Discharge::  7/9/2019   Admitting Physician:  Jerome Valdez MD  Discharge To:  TCU  Primary Care Physician:      No Ref-Primary, Physician          Admission Diagnoses:   Chantal prosthetic femur fracture  Periprosthetic fracture of shaft of femur  Status post hip surgery         Discharge Diagnosis:    same         Procedures Performed:   Open Reduction Internal Fixation of  Left Periprosthetic Femur Proximal, Left Revision Total Hip Arthroplasty         Consultations:   CASE MANAGEMENT ADULT IP CONSULT  INTERNAL MEDICINE ADULT IP CONSULT FOR [facility]  INTERNAL MEDICINE ADULT IP CONSULT FOR [facility]  OCCUPATIONAL THERAPY ADULT IP CONSULT  PHYSICAL THERAPY ADULT IP CONSULT  PHYSICAL THERAPY ADULT IP CONSULT  OCCUPATIONAL THERAPY ADULT IP CONSULT          Brief History:    Mr. Ramirez is a 78 year old male who previously had a left total hip arthroplasty.  Patient presented to the emergency department with pain and inability to ambulate.  He was found to have a periprosthetic left femur fracture.  We discussed management options including open reduction internal fixation and revision surgery.           Hospital Course:   Patient did well post operatively. He was seen and followed by medicine preoperative to assist in management of medical comorbidity. He had light confusion pre op and post operatively initially but this cleared with discontinuation of narcotics . His bowel and bladder function returned to normal. His usual pre op medications have been resumed. Hemoglobin stable. He was seen and evaluated by physical therapy who felt he would benefit from additional structured therapy at a nursing facility. He was deemed stable for discharge on on POD 7.         Medications Prior to Admission:     Medications Prior to Admission    Medication Sig Dispense Refill Last Dose     allopurinol (ZYLOPRIM) 300 MG tablet Take 300 mg by mouth daily   6/30/2019 at Unknown time     lisinopril (PRINIVIL/ZESTRIL) 20 MG tablet Take 20 mg by mouth daily   6/30/2019 at Unknown time     metoprolol succinate ER (TOPROL-XL) 50 MG 24 hr tablet Take 50 mg by mouth daily   6/30/2019 at Unknown time     [DISCONTINUED] ibuprofen (ADVIL/MOTRIN) 200 MG tablet Take 200 mg by mouth 2 times daily as needed for mild pain   Past Week at Unknown time     [DISCONTINUED] loratadine (CLARITIN) 10 MG tablet Take 10 mg by mouth daily as needed for allergies   Past Month at Unknown time     [DISCONTINUED] omeprazole (PRILOSEC OTC) 20 MG EC tablet Take 20 mg by mouth daily   Past Week at Unknown time            Discharge Medications:        Review of your medicines      START taking      Dose / Directions   acetaminophen 325 MG tablet  Commonly known as:  TYLENOL      Dose:  650 mg  Take 2 tablets (650 mg) by mouth every 4 hours as needed for other (multimodal surgical pain management along with NSAIDS and opioid medication as indicated based on pain control and physical function.)  Refills:  0     * albuterol 108 (90 Base) MCG/ACT inhaler  Commonly known as:  PROAIR HFA/PROVENTIL HFA/VENTOLIN HFA      Dose:  2 puff  Inhale 2 puffs into the lungs every 6 hours as needed for wheezing  Refills:  0     * albuterol (5 MG/ML) 0.5% neb solution  Commonly known as:  PROVENTIL      Dose:  2.5 mg  Take 0.5 mLs (2.5 mg) by nebulization every 4 hours as needed for wheezing or shortness of breath / dyspnea  Refills:  0     aspirin 325 MG EC tablet  Commonly known as:  ASA  Indication:  VTE Prophylaxis      Dose:  325 mg  Take 1 tablet (325 mg) by mouth 2 times daily  Refills:  0     bisacodyl 10 MG suppository  Commonly known as:  DULCOLAX      Dose:  10 mg  Start taking on:  7/10/2019  Place 1 suppository (10 mg) rectally daily  Refills:  0     folic acid 1 MG tablet  Commonly known as:   FOLVITE      Dose:  1 mg  Start taking on:  7/10/2019  Take 1 tablet (1 mg) by mouth daily  Refills:  0     melatonin 1 MG Tabs tablet      Dose:  1 mg  Take 1 tablet (1 mg) by mouth nightly as needed for sleep  Refills:  0     methocarbamol 500 MG tablet  Commonly known as:  ROBAXIN      Dose:  500 mg  Take 1 tablet (500 mg) by mouth 4 times daily as needed for muscle spasms  Refills:  0     multivitamin w/minerals tablet      Dose:  1 tablet  Start taking on:  7/10/2019  Take 1 tablet by mouth daily  Refills:  0     omeprazole 20 MG DR capsule  Commonly known as:  priLOSEC      Dose:  20 mg  Start taking on:  7/10/2019  Take 1 capsule (20 mg) by mouth every morning (before breakfast)  Refills:  0     senna-docusate 8.6-50 MG tablet  Commonly known as:  SENOKOT-S/PERICOLACE      Dose:  1 tablet  Take 1 tablet by mouth 2 times daily  Refills:  0     vitamin B1 100 MG tablet  Commonly known as:  THIAMINE      Dose:  100 mg  Start taking on:  7/10/2019  Take 1 tablet (100 mg) by mouth daily  Refills:  0         * This list has 2 medication(s) that are the same as other medications prescribed for you. Read the directions carefully, and ask your doctor or other care provider to review them with you.            CONTINUE these medicines which have NOT CHANGED      Dose / Directions   allopurinol 300 MG tablet  Commonly known as:  ZYLOPRIM      Dose:  300 mg  Take 300 mg by mouth daily  Refills:  0     lisinopril 20 MG tablet  Commonly known as:  PRINIVIL/ZESTRIL      Dose:  20 mg  Take 20 mg by mouth daily  Refills:  0     metoprolol succinate ER 50 MG 24 hr tablet  Commonly known as:  TOPROL-XL      Dose:  50 mg  Take 50 mg by mouth daily  Refills:  0        STOP taking    ibuprofen 200 MG tablet  Commonly known as:  ADVIL/MOTRIN        loratadine 10 MG tablet  Commonly known as:  CLARITIN        omeprazole 20 MG EC tablet  Commonly known as:  priLOSEC OTC                       Pending Results at Discharge:   None          Discharge Instructions:     Discharge Procedure Orders   General info for SNF   Order Comments: Length of Stay Estimate: Short Term Care: Estimated # of Days <30  Condition at Discharge: Improving  Level of care:skilled   Rehabilitation Potential: Good  Admission H&P remains valid and up-to-date: Yes  Recent Chemotherapy: N/A  Use Nursing Home Standing Orders: Yes     Mantoux instructions   Order Comments: Give two-step Mantoux (PPD) Per Facility Policy Yes     Reason for your hospital stay   Order Comments: You had a fracture repaired.     Wound care (specify)   Order Comments: Left thigh, Aquacel leave on for 3 more days. May remove after three days and leave open to air as long as wound is clean and dry. Notify surgeon at [phone] if any wound drainage is noted.     Activity - Up with assistive device   Order Comments: Activity: Up with assist and assistive devices as needed until independent. Absolutely no active abduction about the hip. Posterior hip precautions to operative hip x 3 months:  1) No hip flexion beyond 90 degrees  2) No adduction beyond midline  3) No internal rotation beyond neutral     Order Specific Question Answer Comments   Is discharge order? Yes      Weight bearing status   Order Comments: Toe touch weight bearing on affected extremity     Full Code     Order Specific Question Answer Comments   Code status determined by: Discussion with patient/legal decision maker      Physical Therapy Adult Consult   Order Comments: Evaluate and treat as clinically indicated.    Reason:   - ORIF of  Left Periprosthetic Femur Proximal, Left Revision ARNOLDO on 7/2/2019 with Dr. Valdez     Occupational Therapy Adult Consult   Order Comments: Evaluate and treat as clinically indicated.    Reason:    - ORIF of  Left Periprosthetic Femur Proximal, Left Revision ARNOLDO on 7/2/2019 with Dr. Valdez     Fall precautions     Advance Diet as Tolerated   Order Comments: Follow this diet upon discharge: regular adult diet      Order Specific Question Answer Comments   Is discharge order? Yes      Future Appointments   Date Time Provider Department Center   8/1/2019  1:45 PM Jerome Valdez MD [facility]       ABILIO Jean, CNP  Department of Orthopedic Surgery  [facility]  [phone]

## 2019-07-09 NOTE — PLAN OF CARE
Patient disoriented to time.  Pt up with A1 and a walker. Lung sounds clear throughout. Patient denies chest pain, SOB, lightheadedness, dizziness, tingling, numbness, nausea, and vomiting. Bowel sounds active, last BM 7/8, passing flatus, and tolerating regular diet. Drinking well and voiding spontaneously without difficulties-incontinent at times of confusion have to check brief. No PIV access. Patient able to wiggle toes. CMS intact. Dried drainage on aquacels. Pain is tolerable and patient taking PRN tylenol for pain, ice pack applied.Plan is to discharge to TCU in [place] later tonight via wheelchair ride and wife will meet him up there. Patient demonstrates ability to use call light appropriately. Wife at bedside. Will continue to monitor patient.

## 2019-07-09 NOTE — PROGRESS NOTES
[facility] Progress Note - Hospitalist Service       Date of Admission:  6/30/2019  Assessment & Plan       Varun Ramirez is a 78 year old male with a left total hip arthroplasty done in 2005, now admitted with a periprosthetic femur fracture after a fall on June 28 of 2019.  He was transferred from an outside facility due to unavailability to obtain the implants for the surgery.  Patient admitted under the orthopedic team.   Individual problems and their management are outlined below    #Left periprosthetic hip fracture:# POD6  S/P opertaive repair on 7/2  Management by primary team. Please see their notes for further details  DVT prophylaxis with  SCDs while in hospital, 325 mg aspirin BID x4 weeks total.  PT/OT as per protocol  Incentive spirometry and aggressive bowel regimen  Post op Hgb at 11.1    # Permanent pacemaker in place. 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  Stress test 2016: No clear evidence of any of ischemia.    Ejection fraction 50%.   No history of coronary artery disease.   No palpitations.   EKG 12 lead done, reviewed: Poor quality, 64/min.    Paced rhythm.     # HT: BP is good. Blood pressure controlled currently.  Home medications: Metoprolol XL 50 mg daily --continue.    Resume Lisinopril 20 mg daily. Continue.     #History of COPD: Stable. Chronic cough. Do good Is. PRN albuterol. Barely needs it. Still smokes cigars daily. If cough worsens, consider CXR. Does not want any cough syrup. Remote history of smoking. Not on home oxygen.     ? SANDY: Mild sleep apnea, per wife, sleep study done. did not qualify for CPAP.     --Encourage incentive spirometry  --Aggressive pulmonary toileting as needed  --Albuterol inhaler, nebulization's as needed.   --Minimize use of narcotics as able  --Supplemental oxygen as needed  --Encourage smoking  cessation     #History of gout: Stable.    -Continue prior to admission allopurinol.     # GERD: Controlled.   -Continue omeprazole-takes intermittently at home.      # History of alcohol abuse:  Patient drinks beers, nikita daily.  Last use 4 days ago. Denies prior alcohol withdrawal.   Had mild post of disorientation likely secondary to alcohol withdrawal and other additional factors including anesthetics, pain meds etc.   -Delirium has resolved.      #Delirium, acute: ( Resolved)  Likely related to hospitalizations, narcotics, possible alcohol withdrawal etc.   No evidence of sepsis. UA OSH: No UTI. CXR: OSH: No acute infiltrate.   CT head and cervical spine OSH: No acute fracture or abnormality.   Ethanol: 0.174 on admission   Delirium now resolved.      Diet: Regular Diet Adult    DVT Prophylaxis: To be determined after surgery   [place] Catheter: not present  Code Status: Full Code      Disposition Plan   Expected discharge: Per primary team. Medically stable to discharge to TCU   Entered: Jo Elziabeth MD 07/09/2019, 2:19 PM       The patient's care was discussed with the Bedside Nurse, Care Coordinator/, Patient and Patient's Family.    Jo Elizabeth MD  Hospitalist Service  [facility], [place]    ______________________________________________________________________    Interval History     He reports some chronic cough.  A little mucous.     Data reviewed today:     I reviewed all medications, new labs and imaging results over the last 24 hours. I personally reviewed no images or EKG's today.    Physical Exam      Vital Signs: Temp: 96.2  F (35.7  C) Temp src: Oral BP: 137/51 Pulse: 60 Heart Rate: 64 Resp: 16 SpO2: 95 % O2 Device: None (Room air)    Weight: 240 lbs 4.82 oz  General Appearance: Awake, alert and not in distress  Respiratory: Clear breath sounds bilaterally. Discomfort on deep palpation in the left upper qdt, consistent with the chest pain  bernardino referred to   Cardiovascular: Normal heart sounds. No murmurs   GI: Soft, non tender. Normal bowel sounds   Skin: No bruising or bleeding   Other:Bilateral lower extremity edema noted.     Data      Recent Labs   Lab 07/04/19  0627 07/03/19  0916 07/03/19  0509   HGB 11.1*  --  10.9*   NA  --  138  --    POTASSIUM  --  4.1  --    CHLORIDE  --  105  --    CO2  --  26  --    BUN  --  19  --    CR  --  0.82  --    ANIONGAP  --  7  --    GEORGE  --  8.3*  --    GLC  --  166*  --

## 2019-07-09 NOTE — PROGRESS NOTES
Orthopedic Surgery Progress Note    Subjective: No acute events overnight. Pain controlled. Tolerating PO trials. Voiding spontaneously with condom catheter. Denies cp, sob, calf pain, fevers, chills, numbness/tingling in distal extremity.    Exam:  /66 (BP Location: Left arm)   Pulse 65   Temp 97.9  F (36.6  C) (Oral)   Resp 16   Wt 109 kg (240 lb 4.8 oz)   SpO2 100%   Gen: Awake, AOx3 this morning  Resp: breathing equal and non-labored  Extremities:    LLE: operative dressing c/d/i. 5/5 EHL/FHL/TA/Gsc. SILT in s/s/dp/sp/t distributions. 2+ PT pulses. Toes WWP      Labs:    Recent Labs   Lab 07/04/19  0627 07/03/19  0509   HGB 11.1* 10.9*     Recent Labs   Lab 07/03/19  0916      POTASSIUM 4.1   CHLORIDE 105   CO2 26   BUN 19   CR 0.82   *     No lab results found in last 7 days.    Assessment:   78 year old male with PMH of CVS with pacemaker, COPD, SANDY, gout, GERD, EtOH abuse who presents with a periprosthetic fracture around Left ARNOLDO and is now s/p:    Procedure:  - ORIF of  Left Periprosthetic Femur Proximal, Left Revision ARNOLDO on 7/2/2019 with Dr. Valdez    Plan:  Ortho Primary  Activity: Up with assist and assistive devices as needed until independent. Absolutely no active abduction about the hip. Posterior hip precautions to operative hip x 3 months:  1) No hip flexion beyond 90 degrees  2) No adduction beyond midline  3) No internal rotation beyond neutral   Weight bearing status: TTWB x 6 weeks, ambulate with assistive devices  Antibiotics: Cefazolin x 24 hours complete  Diet: Begin with clear fluids and progress diet as tolerated. Bowel regimen. Anti-emetics PRN.    DVT prophylaxis: Mechanical while in hospital with Aspirin 325mg BID x 4 weeks  Elevation: Elevate heels off of bed on pillows   Wound Care: Aquacel x 7 days.    Drains: out  Pain management: Orals PRN, IV for breakthrough only  X-rays: AP Pelvis and lateral in PACU  Physical Therapy: Mobilization, ROM, ADL's, Posterior  hip precautions  Occupational Therapy: ADL's  Labs: Trend Hgb on POD #1, 2  Consults: PT, OT. Hospitalist, appreciate assistance in caring for this patient throughout the perioperative period  Follow Up: with Dr. Valdez clinic in 4 weeks for wound check    Dispo: Pending TCU placement    Future Appointments   Date Time Provider Department Center   7/3/2019  6:30 AM UR OT OVERFLOW UROT [place]   7/3/2019  9:00 AM Maryjo Peraza, PT URPT [place]   7/3/2019  2:45 PM Annmarie Lopez Pt, PT [facility]        Yury Mendoza, PGY4  Orthopedic Surgery Resident  Pager [phone]

## 2019-07-09 NOTE — PLAN OF CARE
Discharge Planner PT   Patient plan for discharge: TCU  Current status: modAx2 supine>sit, maxAx2 sit>supine, stands to FWW modAx2 ~45 sec each x2 with cues/education on proper TTWB positioning as pt likely putting a bit more through L LE. Pt also engages in supine LE strengthening.  Barriers to return to prior living situation: medical needs, TTWB mobility, current level of function  Recommendations for discharge: TCU  Rationale for recommendations: Pt currently below baseline level of function and would benefit from ongoing therapy to address the above deficits in order to progress towards PLOF and promote IND mobility.       Entered by: Maryjo Peraza 07/09/2019 11:49 AM

## 2019-07-09 NOTE — PLAN OF CARE
Discharge Planner PT   Patient plan for discharge: Rehab  Current status: Up in chair upon arrival. Heavy modA to stand with walker. Does not maintain TTWB throughout (appears to be more 50%). Attempted cues for WB, safety, sequence and hand placement but no carry over noted. For sit to supine needed cues and maxA to L LE. Ended with needs in reach.  Barriers to return to prior living situation: safety, independence, pain, precautions, activity tolerance  Recommendations for discharge: Rehab  Rationale for recommendations: To progress the above stated    Physical Therapy Discharge Summary    Reason for therapy discharge:    Discharged to transitional care facility.    Progress towards therapy goal(s). See goals on Care Plan in [facility] electronic health record for goal details.  Goals partially met.  Barriers to achieving goals:   limited tolerance for therapy.    Therapy recommendation(s):    Continued therapy is recommended.  Rationale/Recommendations:  See above.           Entered by: Nadiya Lindo 07/09/2019 1:43 PM

## 2019-07-09 NOTE — PLAN OF CARE
VS: /51 (BP Location: Left arm)   Pulse 60   Temp 96.2  F (35.7  C) (Oral)   Resp 16   Wt 109 kg (240 lb 4.8 oz)   SpO2 95%      O2: Room air   Output: Uses urinal and incontinent at times, must check brief will not report when wet   Last BM: 7/8   Activity: Up with assist of 1   Up for meals? yes   Skin: Incision   Pain: Tylenol and ice   CMS: intact   Dressing: Dried drainage   Diet: Regular   LDA: No Access   Equipment: Chair alarm, hx of dementia, forgetful   Plan: Discharge to TCU tonight, ride coming at 0216   Additional Info: Bed alarm

## 2019-07-09 NOTE — TELEPHONE ENCOUNTER
----- Message from ABILIO Fuller CNP sent at 7/9/2019  1:38 PM CDT -----  Pt is post op femur fracture of 7-2-2019-with Dr Valdez. He needs a 4 week follow up with xrays . He will discharge to TCU today.   He is going to [facility].    Thank you   Venecia

## 2019-07-09 NOTE — PLAN OF CARE
Occupational Therapy Discharge Summary    Reason for therapy discharge:    Discharged to transitional care facility.    Progress towards therapy goal(s). See goals on Care Plan in [facility] electronic health record for goal details.  Goals partially met.  Barriers to achieving goals:   discharge from facility.    Therapy recommendation(s):    Continued therapy is recommended.  Rationale/Recommendations:  limited self-cares/mobility.

## 2019-07-09 NOTE — PLAN OF CARE
Patient disoriented to time and situation at times. Bed alarm on. Pt up with A2-3 TTWB, pt tends to not be compliant with TTWB. Took A3 to get pt up to commode this afternoon. Lung sounds clear throughout. Patient denies chest pain, SOB, lightheadedness, dizziness, tingling, numbness, nausea, and vomiting. Bowel sounds active, last BM 7/8, passing flatus, and tolerating regular diet. Drinking well and voiding spontaneously without difficulties. Pt is incontinent at times, condom catheter came off again this shift. New one just arrived, but pt has been using urinal. PIV SL in L lower. Patient able to wiggle toes. CMS intact. Dressing clean, dry, and intact. Pain is tolerable and patient taking PRN tylenol for pain, ice pack applied.Plan is to discharge to TCU once bed is available. Patient demonstrates ability to use call light appropriately. Will continue to monitor patient.

## 2019-07-09 NOTE — PLAN OF CARE
VS: Temp: 97.9  F (36.6  C) Temp src: Oral BP: 132/66 Pulse: 65 Heart Rate: 60 Resp: 16    O2: SpO2: 100 % O2 Device: None (Room air)      Output: Urine output adequate via urinal   Last BM: 7/8   Activity: Up with Ax2   Skin: Intact - incision    Pain: Managed with Tylenol and ice packs   CMS: Intact - slight tingling in left big toe per pt report   Dressing: CDI   Diet: Regular diet, denies N/V   LDA: PIV, SL   Equipment: PCDs   Plan: TCU pending bed availability    Additional Info: Pt alert and oriented x4 tonight, calling appropriately.

## 2019-07-10 NOTE — PROGRESS NOTES
Pt discharged to TCU via wheelchair ride with wife at 1845. RN to RN report given to TCU and all questions answered. Packet printed and given to wife. All personal belongings with pt.

## 2019-07-15 NOTE — TELEPHONE ENCOUNTER
RECORDS RECEIVED FROM: ED follow up DOS 7/2/19 Open Reduction Internal Fixation of  Left Periprosthetic Femur Proximal, Left Revision Total Hip Arthroplasty - needs xrays   DATE RECEIVED: Aug 1, 2019    NOTES STATUS DETAILS   OFFICE NOTE from referring provider N/A    OFFICE NOTE from other specialist N/A    DISCHARGE SUMMARY from hospital Internal and CE 6/28/19 6/30/19   DISCHARGE REPORT from the ER Care Everywhere 6/27/19   OPERATIVE REPORT Internal 7/2/19   MEDICATION LIST Internal    IMPLANT RECORD/STICKER Internal    LABS     CBC/DIFF Internal    CULTURES N/A    INJECTIONS DONE IN RADIOLOGY N/A    MRI N/A    CT SCAN N/A    XRAYS (IMAGES & REPORTS) [facility]  6/28/19 [facility]  7/2/19 internal   TUMOR     PATHOLOGY  Slides & report N/A

## 2019-07-30 DIAGNOSIS — Z98.890 STATUS POST HIP SURGERY: ICD-10-CM

## 2019-07-30 DIAGNOSIS — M97.8XXA PERIPROSTHETIC FRACTURE OF SHAFT OF FEMUR: Primary | ICD-10-CM

## 2019-07-30 DIAGNOSIS — Z96.649 PERIPROSTHETIC FRACTURE OF SHAFT OF FEMUR: Primary | ICD-10-CM

## 2019-07-31 ASSESSMENT — ENCOUNTER SYMPTOMS
MEMORY LOSS: 1
SYNCOPE: 0
HYPOTENSION: 0
LEG PAIN: 1
PARALYSIS: 0
RECTAL PAIN: 0
ABDOMINAL PAIN: 0
NECK PAIN: 0
JOINT SWELLING: 1
DIZZINESS: 1
INSOMNIA: 1
SPEECH CHANGE: 0
WEAKNESS: 1
BOWEL INCONTINENCE: 0
LIGHT-HEADEDNESS: 1
PANIC: 0
EXERCISE INTOLERANCE: 1
DISTURBANCES IN COORDINATION: 1
DEPRESSION: 0
ORTHOPNEA: 0
BACK PAIN: 1
DYSURIA: 0
NAIL CHANGES: 0
LOSS OF CONSCIOUSNESS: 0
DECREASED APPETITE: 0
FEVER: 0
WHEEZING: 0
SKIN CHANGES: 0
MUSCLE CRAMPS: 0
POLYPHAGIA: 0
SHORTNESS OF BREATH: 1
FATIGUE: 0
DIARRHEA: 0
STIFFNESS: 1
SPUTUM PRODUCTION: 1
MUSCLE WEAKNESS: 1
HALLUCINATIONS: 0
WEIGHT GAIN: 0
HEADACHES: 0
INCREASED ENERGY: 1
DYSPNEA ON EXERTION: 1
JAUNDICE: 0
PALPITATIONS: 0
COUGH DISTURBING SLEEP: 0
NAUSEA: 0
SNORES LOUDLY: 1
MYALGIAS: 1
SEIZURES: 0
TREMORS: 0
POSTURAL DYSPNEA: 0
POOR WOUND HEALING: 0
HYPERTENSION: 1
NIGHT SWEATS: 1
FLANK PAIN: 0
DECREASED CONCENTRATION: 1
BLOOD IN STOOL: 0
ALTERED TEMPERATURE REGULATION: 1
POLYDIPSIA: 0
ARTHRALGIAS: 1
CONSTIPATION: 1
DIFFICULTY URINATING: 1
COUGH: 1
NERVOUS/ANXIOUS: 0
BLOATING: 0
TINGLING: 0
NUMBNESS: 0
HEMATURIA: 0
VOMITING: 0
CHILLS: 0
SLEEP DISTURBANCES DUE TO BREATHING: 0
HEMOPTYSIS: 0
WEIGHT LOSS: 0

## 2019-08-01 ENCOUNTER — ANCILLARY PROCEDURE (OUTPATIENT)
Dept: GENERAL RADIOLOGY | Facility: CLINIC | Age: 79
End: 2019-08-01
Attending: ORTHOPAEDIC SURGERY
Payer: COMMERCIAL

## 2019-08-01 ENCOUNTER — ANCILLARY PROCEDURE (OUTPATIENT)
Dept: ULTRASOUND IMAGING | Facility: CLINIC | Age: 79
End: 2019-08-01
Attending: ORTHOPAEDIC SURGERY
Payer: COMMERCIAL

## 2019-08-01 ENCOUNTER — OFFICE VISIT (OUTPATIENT)
Dept: ORTHOPEDICS | Facility: CLINIC | Age: 79
End: 2019-08-01
Payer: COMMERCIAL

## 2019-08-01 ENCOUNTER — PRE VISIT (OUTPATIENT)
Dept: ORTHOPEDICS | Facility: CLINIC | Age: 79
End: 2019-08-01

## 2019-08-01 DIAGNOSIS — Z96.649 PERIPROSTHETIC FRACTURE OF SHAFT OF FEMUR: ICD-10-CM

## 2019-08-01 DIAGNOSIS — R60.9 EDEMA, UNSPECIFIED TYPE: ICD-10-CM

## 2019-08-01 DIAGNOSIS — M97.8XXA PERI-PROSTHETIC FRACTURE AROUND PROSTHETIC HIP: ICD-10-CM

## 2019-08-01 DIAGNOSIS — M97.8XXA PERIPROSTHETIC FRACTURE OF SHAFT OF FEMUR: ICD-10-CM

## 2019-08-01 DIAGNOSIS — Z96.649 PERI-PROSTHETIC FRACTURE AROUND PROSTHETIC HIP: ICD-10-CM

## 2019-08-01 DIAGNOSIS — M97.8XXD PERIPROSTHETIC FRACTURE OF HIP, SUBSEQUENT ENCOUNTER: Primary | ICD-10-CM

## 2019-08-01 DIAGNOSIS — Z98.890 STATUS POST HIP SURGERY: ICD-10-CM

## 2019-08-01 DIAGNOSIS — R60.9 EDEMA: ICD-10-CM

## 2019-08-01 DIAGNOSIS — Z96.649 PERIPROSTHETIC FRACTURE OF HIP, SUBSEQUENT ENCOUNTER: Primary | ICD-10-CM

## 2019-08-01 NOTE — LETTER
Date:August 21, 2019      Patient was self referred, no letter generated. Do not send.        [facility] Health Information

## 2019-08-01 NOTE — LETTER
8/1/2019       RE: Varun Ramirez  [address]  [place] [address]     Dear Colleague,    Thank you for referring your patient, Varun Ramirez, to the [facility]. Please see a copy of my visit note below.        [facility]  Orthopaedic Surgery, Joint Replacement Consultation  by Jerome Valdez M.D.    Varun Ramirez MRN# [id number]   Age: 78 year old YOB: 1940     Requesting physician: No ref. provider found  No Ref-Primary, Physician     DX:  1. L femur periprosthetic fx    TREATMENTS:  1. 2005, Bilateral ARNOLDO (Carlitos Heeter) [place]  2. 2006, L ARNOLDO revision (Carlitos Heeter) [employer] [facility]-M2A Magnum system: [id number]  Press Fit Cup size 50mm (Ref [id number]), Magnum Tpr Adptr 42-50mm +6mm (Ref [id number]), M2a Magnum Modular Head Sz 44mm (Ref [id number]).    3. 7/2/2019, ORIF L femur periprosthetic fx (José Miguel) [facility]           Assessment and Plan:   Assessment:  78 year old male with a healing proximal femur periprosthetic fx. S/p revision L ARNOLDO with cable fixation greater trochanter. Stable.     Plan:  1. PT orders: I recommend he continue to progress with weight bearing as tolerated. Teach patient to arise from chair to walker. Gait training and strengthening advised.   2. Check ultrasound for lower extremity DVT. If ultrasound is negative, he may discontinue aspirin.   3. Once patient is able to ambulate with a walker, he may proceed to home environment.  4. Follow-up in 3 months.           History of Present Illness:   78 year old male s/p the above procedure who presents for postoperative follow-up. He was seen in the ED where he underwent a L periprosthetic ORIF to repair a periprosthetic left femur fracture on 7/9/19. Since this surgery, he has been doing well. He recently discontinued physical therapy at an assisted living facility. He has not yet began to ambulate with a walker. Currently taking aspirin. Has no other concerns.             Physical Exam:     EXAMINATION pertinent findings:   VITAL SIGNS: There were no vitals taken for this visit.  There is no height or weight on file to calculate BMI.  MUSCULOSKELETAL:   Incision healed. Dry.  Sutures removed.  2+ edema LLE  0-90 degrees flexion.  No pain with rotation of the hip joint.         Data:   All laboratory data reviewed  All imaging studies reviewed by me    Implant in stable position              Attending MD (Dr. Jerome Valdez) Attestation :  This patient was seen and evaluated by me including a history, exam, and interpretation of all imaging and/or lab data.  Either a training physician (resident/fellow), who also saw the patient, or scribe has documented the clinic visit in the attached note.    Jerome Valdez MD  [facility] Family Professor  Oncology and Adult Reconstructive Surgery  Dept Orthopaedic Surgery, [facility]  [phone] office, [phone] pager  [email or web address]    Scribe Disclosure:  IChivo, am serving as a scribe to document services personally performed by Jerome Valdez MD at this visit, based upon the provider's statements to me. All documentation has been reviewed by the aforementioned provider prior to being entered into the official medical record.    DATA for DOCUMENTATION:         Past Medical History:     Patient Active Problem List   Diagnosis     Periprosthetic fracture of shaft of femur     Status post hip surgery     Past Medical History:   Diagnosis Date     PONV (postoperative nausea and vomiting)        Also see scanned health assessment forms.       Past Surgical History:     Past Surgical History:   Procedure Laterality Date     ARTHROPLASTY REVISION HIP Left 7/2/2019    Procedure: Open Reduction Internal Fixation of  Left Periprosthetic Femur Proximal, Left Revision Total Hip Arthroplasty;  Surgeon: Jerome Valdez MD;  Location:  OR            Social History:     Social History     Socioeconomic History     Marital  status:      Spouse name: Not on file     Number of children: Not on file     Years of education: Not on file     Highest education level: Not on file   Occupational History     Not on file   Social Needs     Financial resource strain: Not on file     Food insecurity:     Worry: Not on file     Inability: Not on file     Transportation needs:     Medical: Not on file     Non-medical: Not on file   Tobacco Use     Smoking status: Current Every Day Smoker     Types: Cigars     Tobacco comment: 1 cigars per day   Substance and Sexual Activity     Alcohol use: Not on file     Drug use: Not on file     Sexual activity: Not on file   Lifestyle     Physical activity:     Days per week: Not on file     Minutes per session: Not on file     Stress: Not on file   Relationships     Social connections:     Talks on phone: Not on file     Gets together: Not on file     Attends Mormonism service: Not on file     Active member of club or organization: Not on file     Attends meetings of clubs or organizations: Not on file     Relationship status: Not on file     Intimate partner violence:     Fear of current or ex partner: Not on file     Emotionally abused: Not on file     Physically abused: Not on file     Forced sexual activity: Not on file   Other Topics Concern     Parent/sibling w/ CABG, MI or angioplasty before 65F 55M? Not Asked   Social History Narrative     Not on file            Family History:     No family history on file.         Medications:     Current Outpatient Medications   Medication Sig     acetaminophen (TYLENOL) 325 MG tablet Take 2 tablets (650 mg) by mouth every 4 hours as needed for other (multimodal surgical pain management along with NSAIDS and opioid medication as indicated based on pain control and physical function.)     albuterol (PROAIR HFA/PROVENTIL HFA/VENTOLIN HFA) 108 (90 Base) MCG/ACT inhaler Inhale 2 puffs into the lungs every 6 hours as needed for wheezing     albuterol (PROVENTIL) (5  MG/ML) 0.5% neb solution Take 0.5 mLs (2.5 mg) by nebulization every 4 hours as needed for wheezing or shortness of breath / dyspnea     allopurinol (ZYLOPRIM) 300 MG tablet Take 300 mg by mouth daily     aspirin (ASA) 325 MG EC tablet Take 1 tablet (325 mg) by mouth 2 times daily     bisacodyl (DULCOLAX) 10 MG suppository Place 1 suppository (10 mg) rectally daily     folic acid (FOLVITE) 1 MG tablet Take 1 tablet (1 mg) by mouth daily     lisinopril (PRINIVIL/ZESTRIL) 20 MG tablet Take 20 mg by mouth daily     melatonin 1 MG TABS tablet Take 1 tablet (1 mg) by mouth nightly as needed for sleep     methocarbamol (ROBAXIN) 500 MG tablet Take 1 tablet (500 mg) by mouth 4 times daily as needed for muscle spasms     metoprolol succinate ER (TOPROL-XL) 50 MG 24 hr tablet Take 50 mg by mouth daily     multivitamin w/minerals (THERA-VIT-M) tablet Take 1 tablet by mouth daily     omeprazole (PRILOSEC) 20 MG DR capsule Take 1 capsule (20 mg) by mouth every morning (before breakfast)     senna-docusate (SENOKOT-S/PERICOLACE) 8.6-50 MG tablet Take 1 tablet by mouth 2 times daily     vitamin B1 (THIAMINE) 100 MG tablet Take 1 tablet (100 mg) by mouth daily     No current facility-administered medications for this visit.               Review of Systems:   A comprehensive 10 point review of systems (constitutional, ENT, cardiac, peripheral vascular, lymphatic, respiratory, GI, , Musculoskeletal, skin, Neurological) was performed and found to be negative except as described in this note.     See intake form completed by patient      Answers for HPI/ROS submitted by the patient on 7/31/2019   General Symptoms: Yes  Skin Symptoms: Yes  HENT Symptoms: No  EYE SYMPTOMS: No  HEART SYMPTOMS: Yes  LUNG SYMPTOMS: Yes  INTESTINAL SYMPTOMS: Yes  URINARY SYMPTOMS: Yes  REPRODUCTIVE SYMPTOMS: No  SKELETAL SYMPTOMS: Yes  BLOOD SYMPTOMS: No  NERVOUS SYSTEM SYMPTOMS: Yes  MENTAL HEALTH SYMPTOMS: Yes  Fever: No  Loss of appetite: No  Weight  loss: No  Weight gain: No  Fatigue: No  Night sweats: Yes  Chills: No  Increased stress: No  Excessive hunger: No  Excessive thirst: No  Feeling hot or cold when others believe the temperature is normal: Yes  Loss of height: No  Post-operative complications: No  Surgical site pain: No  Hallucinations: No  Change in or Loss of Energy: Yes  Hyperactivity: No  Confusion: Yes  Changes in hair: No  Changes in moles/birth marks: No  Itching: No  Rashes: No  Changes in nails: No  Acne: No  Change in facial hair: No  Warts: No  Non-healing sores: No  Scarring: No  Flaking of skin: No  Color changes of hands/feet in cold : No  Sun sensitivity: No  Skin thickening: No  Cough: Yes  Sputum or phlegm: Yes  Coughing up blood: No  Difficulty breating or shortness of breath: Yes  Snoring: Yes  Wheezing: No  Difficulty breathing on exertion: Yes  Nighttime Cough: No  Difficulty breathing when lying flat: No  Chest pain or pressure: Yes  Fast or irregular heartbeat: No  Pain in legs with walking: Yes  Trouble breathing while lying down: No  Fingers or toes appear blue: No  High blood pressure: Yes  Low blood pressure: No  Fainting: No  Murmurs: No  Pacemaker: Yes  Varicose veins: No  Edema or swelling: Yes  Wake up at night with shortness of breath: No  Light-headedness: Yes  Exercise intolerance: Yes  Nausea: No  Vomiting: No  Abdominal pain: No  Bloating: No  Constipation: Yes  Diarrhea: No  Blood in stool: No  Black stools: No  Rectal or Anal pain: No  Fecal incontinence: No  Yellowing of skin or eyes: No  Vomit with blood: No  Change in stools: No  Trouble holding urine or incontinence: Yes  Pain or burning: No  Trouble starting or stopping: Yes  Increased frequency of urination: Yes  Blood in urine: No  Decreased frequency of urination: No  Frequent nighttime urination: Yes  Flank pain: No  Difficulty emptying bladder: Yes  Back pain: Yes  Muscle aches: Yes  Neck pain: No  Swollen joints: Yes  Joint pain: Yes  Bone pain:  No  Muscle cramps: No  Muscle weakness: Yes  Joint stiffness: Yes  Bone fracture: Yes  Trouble with coordination: Yes  Dizziness or trouble with balance: Yes  Fainting or black-out spells: No  Memory loss: Yes  Headache: No  Seizures: No  Speech problems: No  Tingling: No  Tremor: No  Weakness: Yes  Difficulty walking: Yes  Paralysis: No  Numbness: No  Nervous or Anxious: No  Depression: No  Trouble sleeping: Yes  Trouble thinking or concentrating: Yes  Mood changes: No  Panic attacks: No      Again, thank you for allowing me to participate in the care of your patient.      Sincerely,    Jerome Valdez MD

## 2019-08-01 NOTE — PROGRESS NOTES
[facility]  Orthopaedic Surgery, Joint Replacement Consultation  by Jerome Valdez M.D.    Varun Ramirez MRN# [id number]   Age: 78 year old YOB: 1940     Requesting physician: No ref. provider found  No Ref-Primary, Physician     DX:  1. L femur periprosthetic fx    TREATMENTS:  1. 2005, Bilateral ARNOLDO (Carlitos Heeter) [place]  2. 2006, L ARNOLDO revision (Carlitos Heeter) [employer] orth[facility]-M2A Magnum system: [id number]  Press Fit Cup size 50mm (Ref [id number]), Magnum Tpr Adptr 42-50mm +6mm (Ref [id number]), M2a Magnum Modular Head Sz 44mm (Ref [id number]).    3. 7/2/2019, ORIF L femur periprosthetic fx (José Miguel) [facility]           Assessment and Plan:   Assessment:  78 year old male with a healing proximal femur periprosthetic fx. S/p revision L ARNOLDO with cable fixation greater trochanter. Stable.     Plan:  1. PT orders: I recommend he continue to progress with weight bearing as tolerated. Teach patient to arise from chair to walker. Gait training and strengthening advised.   2. Check ultrasound for lower extremity DVT. If ultrasound is negative, he may discontinue aspirin.   3. Once patient is able to ambulate with a walker, he may proceed to home environment.  4. Follow-up in 3 months.           History of Present Illness:   78 year old male s/p the above procedure who presents for postoperative follow-up. He was seen in the ED where he underwent a L periprosthetic ORIF to repair a periprosthetic left femur fracture on 7/9/19. Since this surgery, he has been doing well. He recently discontinued physical therapy at an assisted living facility. He has not yet began to ambulate with a walker. Currently taking aspirin. Has no other concerns.            Physical Exam:     EXAMINATION pertinent findings:   VITAL SIGNS: There were no vitals taken for this visit.  There is no height or weight on file to calculate BMI.  MUSCULOSKELETAL:   Incision healed. Dry.  Sutures removed.  2+ edema LLE  0-90 degrees flexion.  No  pain with rotation of the hip joint.         Data:   All laboratory data reviewed  All imaging studies reviewed by me    Implant in stable position              Attending MD (Dr. Jerome Valdez) Attestation :  This patient was seen and evaluated by me including a history, exam, and interpretation of all imaging and/or lab data.  Either a training physician (resident/fellow), who also saw the patient, or scribe has documented the clinic visit in the attached note.    Jerome Valdez MD  [employer] Family Professor  Oncology and Adult Reconstructive Surgery  Dept Orthopaedic Surgery, [facility]  [phone] office, [phone] pager  [email or web address]    Scribe Disclosure:  I, Chivo Man, am serving as a scribe to document services personally performed by Jerome Valdez MD at this visit, based upon the provider's statements to me. All documentation has been reviewed by the aforementioned provider prior to being entered into the official medical record.    DATA for DOCUMENTATION:         Past Medical History:     Patient Active Problem List   Diagnosis     Periprosthetic fracture of shaft of femur     Status post hip surgery     Past Medical History:   Diagnosis Date     PONV (postoperative nausea and vomiting)        Also see scanned health assessment forms.       Past Surgical History:     Past Surgical History:   Procedure Laterality Date     ARTHROPLASTY REVISION HIP Left 7/2/2019    Procedure: Open Reduction Internal Fixation of  Left Periprosthetic Femur Proximal, Left Revision Total Hip Arthroplasty;  Surgeon: Jerome Valdez MD;  Location:  OR            Social History:     Social History     Socioeconomic History     Marital status:      Spouse name: Not on file     Number of children: Not on file     Years of education: Not on file     Highest education level: Not on file   Occupational History     Not on file   Social Needs     Financial resource strain: Not on file     Food insecurity:      Worry: Not on file     Inability: Not on file     Transportation needs:     Medical: Not on file     Non-medical: Not on file   Tobacco Use     Smoking status: Current Every Day Smoker     Types: Cigars     Tobacco comment: 1 cigars per day   Substance and Sexual Activity     Alcohol use: Not on file     Drug use: Not on file     Sexual activity: Not on file   Lifestyle     Physical activity:     Days per week: Not on file     Minutes per session: Not on file     Stress: Not on file   Relationships     Social connections:     Talks on phone: Not on file     Gets together: Not on file     Attends Confucianism service: Not on file     Active member of club or organization: Not on file     Attends meetings of clubs or organizations: Not on file     Relationship status: Not on file     Intimate partner violence:     Fear of current or ex partner: Not on file     Emotionally abused: Not on file     Physically abused: Not on file     Forced sexual activity: Not on file   Other Topics Concern     Parent/sibling w/ CABG, MI or angioplasty before 65F 55M? Not Asked   Social History Narrative     Not on file            Family History:     No family history on file.         Medications:     Current Outpatient Medications   Medication Sig     acetaminophen (TYLENOL) 325 MG tablet Take 2 tablets (650 mg) by mouth every 4 hours as needed for other (multimodal surgical pain management along with NSAIDS and opioid medication as indicated based on pain control and physical function.)     albuterol (PROAIR HFA/PROVENTIL HFA/VENTOLIN HFA) 108 (90 Base) MCG/ACT inhaler Inhale 2 puffs into the lungs every 6 hours as needed for wheezing     albuterol (PROVENTIL) (5 MG/ML) 0.5% neb solution Take 0.5 mLs (2.5 mg) by nebulization every 4 hours as needed for wheezing or shortness of breath / dyspnea     allopurinol (ZYLOPRIM) 300 MG tablet Take 300 mg by mouth daily     aspirin (ASA) 325 MG EC tablet Take 1 tablet (325 mg) by mouth 2 times  daily     bisacodyl (DULCOLAX) 10 MG suppository Place 1 suppository (10 mg) rectally daily     folic acid (FOLVITE) 1 MG tablet Take 1 tablet (1 mg) by mouth daily     lisinopril (PRINIVIL/ZESTRIL) 20 MG tablet Take 20 mg by mouth daily     melatonin 1 MG TABS tablet Take 1 tablet (1 mg) by mouth nightly as needed for sleep     methocarbamol (ROBAXIN) 500 MG tablet Take 1 tablet (500 mg) by mouth 4 times daily as needed for muscle spasms     metoprolol succinate ER (TOPROL-XL) 50 MG 24 hr tablet Take 50 mg by mouth daily     multivitamin w/minerals (THERA-VIT-M) tablet Take 1 tablet by mouth daily     omeprazole (PRILOSEC) 20 MG DR capsule Take 1 capsule (20 mg) by mouth every morning (before breakfast)     senna-docusate (SENOKOT-S/PERICOLACE) 8.6-50 MG tablet Take 1 tablet by mouth 2 times daily     vitamin B1 (THIAMINE) 100 MG tablet Take 1 tablet (100 mg) by mouth daily     No current facility-administered medications for this visit.               Review of Systems:   A comprehensive 10 point review of systems (constitutional, ENT, cardiac, peripheral vascular, lymphatic, respiratory, GI, , Musculoskeletal, skin, Neurological) was performed and found to be negative except as described in this note.     See intake form completed by patient      Answers for HPI/ROS submitted by the patient on 7/31/2019   General Symptoms: Yes  Skin Symptoms: Yes  HENT Symptoms: No  EYE SYMPTOMS: No  HEART SYMPTOMS: Yes  LUNG SYMPTOMS: Yes  INTESTINAL SYMPTOMS: Yes  URINARY SYMPTOMS: Yes  REPRODUCTIVE SYMPTOMS: No  SKELETAL SYMPTOMS: Yes  BLOOD SYMPTOMS: No  NERVOUS SYSTEM SYMPTOMS: Yes  MENTAL HEALTH SYMPTOMS: Yes  Fever: No  Loss of appetite: No  Weight loss: No  Weight gain: No  Fatigue: No  Night sweats: Yes  Chills: No  Increased stress: No  Excessive hunger: No  Excessive thirst: No  Feeling hot or cold when others believe the temperature is normal: Yes  Loss of height: No  Post-operative complications: No  Surgical site  pain: No  Hallucinations: No  Change in or Loss of Energy: Yes  Hyperactivity: No  Confusion: Yes  Changes in hair: No  Changes in moles/birth marks: No  Itching: No  Rashes: No  Changes in nails: No  Acne: No  Change in facial hair: No  Warts: No  Non-healing sores: No  Scarring: No  Flaking of skin: No  Color changes of hands/feet in cold : No  Sun sensitivity: No  Skin thickening: No  Cough: Yes  Sputum or phlegm: Yes  Coughing up blood: No  Difficulty breating or shortness of breath: Yes  Snoring: Yes  Wheezing: No  Difficulty breathing on exertion: Yes  Nighttime Cough: No  Difficulty breathing when lying flat: No  Chest pain or pressure: Yes  Fast or irregular heartbeat: No  Pain in legs with walking: Yes  Trouble breathing while lying down: No  Fingers or toes appear blue: No  High blood pressure: Yes  Low blood pressure: No  Fainting: No  Murmurs: No  Pacemaker: Yes  Varicose veins: No  Edema or swelling: Yes  Wake up at night with shortness of breath: No  Light-headedness: Yes  Exercise intolerance: Yes  Nausea: No  Vomiting: No  Abdominal pain: No  Bloating: No  Constipation: Yes  Diarrhea: No  Blood in stool: No  Black stools: No  Rectal or Anal pain: No  Fecal incontinence: No  Yellowing of skin or eyes: No  Vomit with blood: No  Change in stools: No  Trouble holding urine or incontinence: Yes  Pain or burning: No  Trouble starting or stopping: Yes  Increased frequency of urination: Yes  Blood in urine: No  Decreased frequency of urination: No  Frequent nighttime urination: Yes  Flank pain: No  Difficulty emptying bladder: Yes  Back pain: Yes  Muscle aches: Yes  Neck pain: No  Swollen joints: Yes  Joint pain: Yes  Bone pain: No  Muscle cramps: No  Muscle weakness: Yes  Joint stiffness: Yes  Bone fracture: Yes  Trouble with coordination: Yes  Dizziness or trouble with balance: Yes  Fainting or black-out spells: No  Memory loss: Yes  Headache: No  Seizures: No  Speech problems: No  Tingling: No  Tremor:  No  Weakness: Yes  Difficulty walking: Yes  Paralysis: No  Numbness: No  Nervous or Anxious: No  Depression: No  Trouble sleeping: Yes  Trouble thinking or concentrating: Yes  Mood changes: No  Panic attacks: No

## 2019-08-02 ENCOUNTER — TELEPHONE (OUTPATIENT)
Dept: ORTHOPEDICS | Facility: CLINIC | Age: 79
End: 2019-08-02

## 2019-09-24 DIAGNOSIS — M97.8XXA PERIPROSTHETIC FRACTURE AROUND INTERNAL PROSTHETIC HIP JOINT: Primary | ICD-10-CM

## 2019-09-24 DIAGNOSIS — Z96.649 PERIPROSTHETIC FRACTURE AROUND INTERNAL PROSTHETIC HIP JOINT: Primary | ICD-10-CM

## 2019-09-24 RX ORDER — TRAMADOL HYDROCHLORIDE 50 MG/1
50 TABLET ORAL EVERY 6 HOURS PRN
Qty: 30 TABLET | Refills: 0 | Status: SHIPPED | OUTPATIENT
Start: 2019-09-24 | End: 2019-11-04

## 2019-09-24 NOTE — TELEPHONE ENCOUNTER
Spoke with Reyna Varun's wife, re: additional medication for pain. He has not tried Tramadol in the past, but agreed to try it.  Script was called into [facility] in [place].

## 2019-09-27 ENCOUNTER — TELEPHONE (OUTPATIENT)
Dept: NURSING | Facility: CLINIC | Age: 79
End: 2019-09-27

## 2019-09-27 NOTE — TELEPHONE ENCOUNTER
-reviewed the information with CRISTIANA Guzman and spoke to the patient's wife, Reyna, on the phone.  -considering the patient's surgery was on 7-2-19, Dr. Valdez wouldn't typically prescribe narcotic pain medications at this time.  -the recommendations given were to encourage the patient to take Tramadol & Tylenol and continue with RICE measures as directed.   -a clinic visit with Dr. Valdez on Monday, 9-30-19 was also offered.  -the other option was: if the patient feels as if the pain is new, worsening, or unmanageable with the recommendations above, he could always seek evaluation at an ER or urgent care.  -wife, Reyna, asked if she could discuss the options with her  and get back to us accordingly.  -will await their call.  Marlene Wang RN  9/27/2019 11:59 AM       .[facility]: Nurse Triage Note  SITUATION/BACKGROUND                                                      Varun Ramirez is a 79 year old male s/p Open Reduction Internal Fixation of  Left Periprosthetic Femur Proximal, Left Revision Total Hip Arthroplasty procedure on 7/2/19.   Tramadol was prescribed on the 9/24/19 and initially effective. However, it does not seem to help with the pain at this point.   Patient reports having left upper leg pain, moreso than before. Took  tramadol at 8:45 this morning for pain 7-8/10 with little to no relief. Rotating taking Tylenol 325 mg and Ibuprofen. However, patient stated that it is not very effective either.  No swelling, redness, change in skin integrity or limited ROM.   Having home physical therapy 2-3x weekly. Would like to have additional pain medication to aid with pain relief.   Denies any other S/S of protocol.   Patient lives 50 miles away and unable to come into clinic.   Routed to Orthopedics as High Priority for pain management.       RECOMMENDATION/PLAN                                                    Routed to Orthopedics as High Priority for pain management.      RECOMMENDED DISPOSITION:  home care instructions given per protocol. Seek ED care with chest pain, severe SOB, cold or blue foot or leg, no pulse in affected foot.  Will comply with recommendation: Yes    If further questions/concerns or if symptoms do not improve, worsen or new symptoms develop, call your PCP or [phone] to talk with the Resident on call, as soon as possible.    Guideline used: leg pain  Telephone Triage Protocols for Nurses, Fifth Edition, Gaby Stanley RN, RN

## 2019-09-30 ENCOUNTER — OFFICE VISIT (OUTPATIENT)
Dept: ORTHOPEDICS | Facility: CLINIC | Age: 79
End: 2019-09-30
Payer: COMMERCIAL

## 2019-09-30 ENCOUNTER — ANCILLARY PROCEDURE (OUTPATIENT)
Dept: GENERAL RADIOLOGY | Facility: CLINIC | Age: 79
End: 2019-09-30
Attending: ORTHOPAEDIC SURGERY
Payer: COMMERCIAL

## 2019-09-30 ENCOUNTER — HEALTH MAINTENANCE LETTER (OUTPATIENT)
Age: 79
End: 2019-09-30

## 2019-09-30 VITALS — WEIGHT: 245 LBS

## 2019-09-30 DIAGNOSIS — M79.605 PAIN OF LEFT LOWER EXTREMITY: ICD-10-CM

## 2019-09-30 DIAGNOSIS — M25.552 PAIN OF LEFT HIP JOINT: ICD-10-CM

## 2019-09-30 DIAGNOSIS — M25.552 PAIN OF LEFT HIP JOINT: Primary | ICD-10-CM

## 2019-09-30 LAB
CRP SERPL-MCNC: 13.4 MG/L (ref 0–8)
ERYTHROCYTE [SEDIMENTATION RATE] IN BLOOD BY WESTERGREN METHOD: 44 MM/H (ref 0–20)

## 2019-09-30 RX ORDER — TRAMADOL HYDROCHLORIDE 50 MG/1
50 TABLET ORAL
COMMUNITY
Start: 2019-09-24 | End: 2019-10-17

## 2019-09-30 ASSESSMENT — ENCOUNTER SYMPTOMS
DIZZINESS: 1
STIFFNESS: 1
ARTHRALGIAS: 1
JOINT SWELLING: 1
BACK PAIN: 1

## 2019-09-30 ASSESSMENT — PAIN SCALES - GENERAL: PAINLEVEL: EXTREME PAIN (8)

## 2019-09-30 NOTE — LETTER
Date:October 22, 2019      Patient was self referred, no letter generated. Do not send.        [facility] Health Information

## 2019-09-30 NOTE — LETTER
9/30/2019       RE: Varun Ramirez  [address]  [place] [address]     Dear Colleague,    Thank you for referring your patient, Varun Ramirez, to the [facility] ORTHOPAEDIC [facility]IC at [facility]. Please see a copy of my visit note below.        [facility]  Orthopaedic Surgery, Joint Replacement Consultation  by Jerome Valdez M.D.    Varun Ramirez MRN# [id number]   Age: 79 year old YOB: 1940     Requesting physician: Resident Physician Cordelia*  No Ref-Primary, Physician          DX:  1. L femur periprosthetic fx     TREATMENTS:  1. 2005, Bilateral ARNOLDO (Carlitos Heeter) [place]  2. 2006, L ARNOLDO revision (Carlitos Heeter) [employer] orthopedics-M2A Magnum system: [id number]  Press Fit Cup size 50mm (Ref [id number]), Magnum Tpr Adptr 42-50mm +6mm (Ref [id number]), M2a Magnum Modular Head Sz 44mm (Ref [id number]).    3. 7/2/2019, ORIF L femur periprosthetic fx (José Miguel) [facility]               History of Present Illness:   79 year old male s/p the above procedure who presents for postoperative follow-up. I last evaluated the patient on 8/1/19 at which time he was progressing appropriately with no specific concerns.  Today in the office he states that he has been experiencing persistent pain of his left mid thigh.  The pain occurs during partial weightbearing.  When he is sitting, resting or sleeping he has no pain.  He had no persistent wound drainage and the incision healed well.  He is currently partial weightbearing with walker or mobilizes in a wheelchair.  He still uses tramadol daily.             Physical Exam:     EXAMINATION pertinent findings:   VITAL SIGNS: There were no vitals taken for this visit.  There is no height or weight on file to calculate BMI.  RESP: non labored breathing   ABD: benign   SKIN: grossly normal   LYMPHATIC: grossly normal   NEURO: grossly normal   VASCULAR: satisfactory perfusion of all extremities   MUSCULOSKELETAL:   Incision clean and dry.  No signs of  infection.  No fluctuating fluid collections.  Rotation of the left hip is not painful. no tenderness to palpation over the greater trochanter.                 Data:   All laboratory data reviewed  All imaging studies reviewed by me  X femur left shows no signs of subsidence, fracture.  There is an abundance of callus formation around the fracture site.            Assessment and Plan:   Assessment:  79-year-old male status post ORIF left periprosthetic femur fracture with persistent pain of his left leg.  Diagnoses include delayed consolidation of the fracture, early stage hypertrophic nonunion, infection.     Plan:  We discussed the findings with the patient and his spouse.  Infectious parameters will be drawn to evaluate the possibility of infection.  If these are significantly elevated we will ask interventional radiology to obtain fluid through a fluoroscopy guided aspiration procedure.  If there is no signs of infection we will follow-up with him and 1 month with x-ray.      Low Morales MD  [facility] Arthroplasty Fellow      Attending MD (Dr. Jerome Valdez) Attestation :  This patient was seen and evaluated by me including a history, exam, and interpretation of all imaging and/or lab data.  Either a training physician (resident/fellow), who also saw the patient, or scribe has documented the clinic visit in the attached note.    MD Fallon Machuca Family Professor  Oncology and Adult Reconstructive Surgery  Dept Orthopaedic Surgery, [facility]  [phone] office, [phone] pager  [email or web address]    Chivo REDDING, a scribe, prepared the chart for today's encounter.     Total Time = 15 min, 50% of which was spent in counseling and coordination of care as documented above.        Answers for HPI/ROS submitted by the patient on 9/30/2019   General Symptoms: No  Skin Symptoms: No  HENT Symptoms: Yes  EYE SYMPTOMS: No  HEART SYMPTOMS: No  LUNG SYMPTOMS: No  INTESTINAL SYMPTOMS: No  URINARY  SYMPTOMS: Yes  REPRODUCTIVE SYMPTOMS: No  SKELETAL SYMPTOMS: Yes  BLOOD SYMPTOMS: Yes  NERVOUS SYSTEM SYMPTOMS: Yes  MENTAL HEALTH SYMPTOMS: No  Nosebleeds: Yes  Trouble holding urine or incontinence: Yes  Trouble starting or stopping: Yes  Frequent nighttime urination: Yes  Back pain: Yes  Swollen joints: Yes  Joint pain: Yes  Bone pain: Yes  Joint stiffness: Yes  Dizziness or trouble with balance: Yes  Difficulty walking: Yes    \Attending MD (Dr. Jerome Valdez) Attestation :  This patient was seen and evaluated by me including a history, exam, and interpretation of all imaging and/or lab data.  Either a training physician (resident/fellow), who also saw the patient, or scribe has documented the visit in the attached note.    Jerome Valdez MD  [place] Family Professor  Oncology and Adult Reconstructive Surgery  Dept Orthopaedic Surgery, [facility]  [phone] office, [phone] pager  [email or web address]        Again, thank you for allowing me to participate in the care of your patient.      Sincerely,    Jerome Valdez MD

## 2019-09-30 NOTE — NURSING NOTE
Reason For Visit:   Chief Complaint   Patient presents with     Left Thigh - RECHECK     Follow Up     left upper leg pain dos 7-2-19       Wt 111.1 kg (245 lb)          Nathan Holm [facility]

## 2019-10-17 DIAGNOSIS — M97.8XXA PERIPROSTHETIC FRACTURE AROUND INTERNAL PROSTHETIC HIP JOINT: Primary | ICD-10-CM

## 2019-10-17 DIAGNOSIS — Z96.649 PERIPROSTHETIC FRACTURE AROUND INTERNAL PROSTHETIC HIP JOINT: Primary | ICD-10-CM

## 2019-10-17 RX ORDER — TRAMADOL HYDROCHLORIDE 50 MG/1
50 TABLET ORAL EVERY 6 HOURS PRN
Qty: 30 TABLET | Refills: 0 | Status: SHIPPED | OUTPATIENT
Start: 2019-10-17 | End: 2021-05-19

## 2019-10-23 ASSESSMENT — ENCOUNTER SYMPTOMS
DISTURBANCES IN COORDINATION: 0
BACK PAIN: 1
MUSCLE CRAMPS: 0
NUMBNESS: 0
DYSURIA: 0
DIZZINESS: 0
PARALYSIS: 0
SPEECH CHANGE: 0
SEIZURES: 0
NECK PAIN: 0
STIFFNESS: 1
DIFFICULTY URINATING: 1
MYALGIAS: 1
LOSS OF CONSCIOUSNESS: 0
JOINT SWELLING: 1
MUSCLE WEAKNESS: 0
WEAKNESS: 0
HEADACHES: 0
ARTHRALGIAS: 1
MEMORY LOSS: 1
TINGLING: 0
HEMATURIA: 0
TREMORS: 0
FLANK PAIN: 0

## 2019-10-29 DIAGNOSIS — Z96.649 PAIN DUE TO HIP JOINT PROSTHESIS (H): Primary | ICD-10-CM

## 2019-10-29 DIAGNOSIS — T84.84XA PAIN DUE TO HIP JOINT PROSTHESIS (H): Primary | ICD-10-CM

## 2019-10-30 ENCOUNTER — TELEPHONE (OUTPATIENT)
Dept: ORTHOPEDICS | Facility: CLINIC | Age: 79
End: 2019-10-30

## 2019-10-30 DIAGNOSIS — Z96.649 PAIN DUE TO HIP JOINT PROSTHESIS (H): Primary | ICD-10-CM

## 2019-10-30 DIAGNOSIS — T84.84XA PAIN DUE TO HIP JOINT PROSTHESIS (H): Primary | ICD-10-CM

## 2019-10-30 NOTE — TELEPHONE ENCOUNTER
I had sent a [employer] message to Mr. Ramirez letting him know that since his inflammatory markers were elevated, Dr. Valdez was recommending a hip aspiration. Time was availabl for that on 11/1/19.  His wife replied via [employer] that they were going to keep their appt. With Dr. Valdez on 11/4/19, but would not be coming to the aspiration.  I just left a phone message saying that at the 11/4/19 appt,  Dr. Valdez will recommend the aspiration.  I asked them to get back to me about what they wish to do.

## 2019-11-01 DIAGNOSIS — Z98.890 STATUS POST HIP SURGERY: Primary | ICD-10-CM

## 2019-11-04 ENCOUNTER — ANCILLARY PROCEDURE (OUTPATIENT)
Dept: GENERAL RADIOLOGY | Facility: CLINIC | Age: 79
End: 2019-11-04
Attending: ORTHOPAEDIC SURGERY
Payer: COMMERCIAL

## 2019-11-04 ENCOUNTER — OFFICE VISIT (OUTPATIENT)
Dept: ORTHOPEDICS | Facility: CLINIC | Age: 79
End: 2019-11-04
Payer: COMMERCIAL

## 2019-11-04 DIAGNOSIS — Z98.890 STATUS POST HIP SURGERY: Primary | ICD-10-CM

## 2019-11-04 DIAGNOSIS — Z98.890 STATUS POST HIP SURGERY: ICD-10-CM

## 2019-11-04 PROBLEM — I48.92 ATRIAL FLUTTER (H): Status: ACTIVE | Noted: 2018-04-21

## 2019-11-04 PROBLEM — Z96.649 PERI-PROSTHETIC FRACTURE OF FEMUR FOLLOWING TOTAL HIP ARTHROPLASTY: Status: ACTIVE | Noted: 2019-06-27

## 2019-11-04 PROBLEM — M97.8XXA PERI-PROSTHETIC FRACTURE OF FEMUR FOLLOWING TOTAL HIP ARTHROPLASTY: Status: ACTIVE | Noted: 2019-06-27

## 2019-11-04 PROBLEM — M43.06 LUMBAR SPONDYLOLYSIS: Status: ACTIVE | Noted: 2018-12-07

## 2019-11-04 PROBLEM — S72.25XA: Status: ACTIVE | Noted: 2019-06-28

## 2019-11-04 ASSESSMENT — HOOS S4: HOW SEVERE IS YOUR HIP JOINT STIFFNESS AFTER FIRST WAKENING IN THE MORNING?: MODERATE

## 2019-11-04 ASSESSMENT — ACTIVITIES OF DAILY LIVING (ADL)
ADL_SUBSCALE_SCORE: 54.41
ADL_MEAN: 1.82
ADL_SUM: 31

## 2019-11-04 NOTE — LETTER
11/4/2019      RE: Varun Ramirez  [address]  [place] [address]         [facility]  Orthopaedic Surgery, Joint Replacement Consultation  by Jerome Valdez M.D.    Varun Ramirez MRN# [id number]   Age: 79 year old YOB: 1940     Requesting physician: Referred Self  No Ref-Primary, Physician     Background history:  DX:  1. L femur periprosthetic fx     TREATMENTS:  1. 2005, Bilateral ARNOLDO (Carlitos Heeter) [place]  2. 2006, L ARNOLDO revision (Carlitos Heeter) [employer] orthopedics-M2A Magnum system: [id number]  Press Fit Cup size 50mm (Ref [id number]), Magnum Tpr Adptr 42-50mm +6mm (Ref [id number]), M2a Magnum Modular Head Sz 44mm (Ref [id number]).    3. 7/2/2019, ORIF L femur periprosthetic fx (José Miguel) [facility]           History of Present Illness:   79 year old male 3 months s/p the above procedure who presents for postoperative follow-up.  I last evaluated this patient on 9/30/19 and he reported persistent pain in the left mid-thigh. We elected to proceed with additional lab testing which did not show any significant elevation in CRP and the ESR.  No aspiration has been performed yet.  Today he reports that his complaints have significantly diminished.  The pain is well controlled.  Patient does not use any pain medication.  During his last visit patient was unable to partially weight-bear, currently he can fully weight-bear for short distances.  No fevers or wound drainage.  Patient's home physical therapy just ended.  He would like to continue physical therapy on outpatient basis.         Physical Exam:     EXAMINATION pertinent findings:   VITAL SIGNS: There were no vitals taken for this visit.  There is no height or weight on file to calculate BMI.  RESP: non labored breathing   NEURO: grossly normal   VASCULAR: satisfactory perfusion of all extremities   MUSCULOSKELETAL:   Slight Trendelenburg gait.  Flexion extension 95-0.  Pain-free rotations.  The incision is clean and dry.         Data:   All laboratory data  reviewed  All imaging studies reviewed by me  Todays imaging shows no signs of implant loosening or hardware failure.  No signs of fracture.          Assessment and Plan:   Assessment:  79-year-old male status post ORIF left periprosthetic femur fracture who is significantly improving over the past 4 weeks.  No signs of infection.     Plan:  We discussed the findings with the patient and his spouse.  We prescribed an additional physical therapy sessions for gait training, range of motion exercises and strength exercises.  If he continues to improve as the currently does we will follow-up with him 1 year after surgery.  We will see him sooner if there are any problems, questions or concerns.    Low Morales MD  [facility] Arthroplasty Fellow    Attending MD (Dr. Jerome Valdez) Attestation :  This patient was seen and evaluated by me including a history, exam, and interpretation of all imaging and/or lab data.  Either a training physician (resident/fellow), who also saw the patient, or scribe has documented the clinic visit in the attached note.    Jerome Valdez MD  [facility] Family Professor  Oncology and Adult Reconstructive Surgery  Dept Orthopaedic Surgery, [facility]  [phone] office, [phone] pager  [email or web address]    Total Time = 25 min, 50% of which was spent in counseling and coordination of care as documented above.    Scribe Disclosure:  Jorden REDDING, a scribe, prepared the chart for today's encounter.

## 2019-11-04 NOTE — Clinical Note
11/4/2019       RE: Varun Ramirez  [address]  [place] [address]     Dear Colleague,    Thank you for referring your patient, Varun Ramirez, to the [facility] ORTHOPAEDIC [facility]INIC at [facility]. Please see a copy of my visit note below.        [facility]  Orthopaedic Surgery, Joint Replacement Consultation  by Jerome Valdez M.D.    Varun Ramirez MRN# [id number]   Age: 79 year old YOB: 1940     Requesting physician: Referred Self  No Ref-Primary, Physician     Background history:  DX:  1. L femur periprosthetic fx     TREATMENTS:  1. 2005, Bilateral ARNOLDO (Carlitos Heeter) [place]  2. 2006, L ARNOLDO revision (Carlitos Heeter) [employer] orthopedics-M2A Magnum system: [id number]  Press Fit Cup size 50mm (Ref [id number]), Magnum Tpr Adptr 42-50mm +6mm (Ref [id number]), M2a Magnum Modular Head Sz 44mm (Ref [id number]).    3. 7/2/2019, ORIF L femur periprosthetic fx (José Miguel) [facility]           History of Present Illness:   79 year old male 3 months s/p the above procedure who presents for postoperative follow-up.  I last evaluated this patient on 9/30/19 and he reported persistent pain in the left mid-thigh. We elected to proceed with further testing which indicated that his hip was not infected.    Today he reports ***         Physical Exam:     EXAMINATION pertinent findings:   VITAL SIGNS: There were no vitals taken for this visit.  There is no height or weight on file to calculate BMI.  RESP: non labored breathing   ABD: benign   SKIN: grossly normal   LYMPHATIC: grossly normal   NEURO: grossly normal   VASCULAR: satisfactory perfusion of all extremities   MUSCULOSKELETAL:   ***              Data:   All laboratory data reviewed  All imaging studies reviewed by me            Assessment and Plan:   Assessment:  79-year-old male status post ORIF left periprosthetic femur fracture      Plan:  ***      Attending MD (Dr. Jerome Valdez) Attestation :  This patient was seen and evaluated by me  including a history, exam, and interpretation of all imaging and/or lab data.  Either a training physician (resident/fellow), who also saw the patient, or scribe has documented the clinic visit in the attached note.    MD Fallon Machuca Family Professor  Oncology and Adult Reconstructive Surgery  Dept Orthopaedic Surgery, [facility]  [phone] office, [phone] pager  [email or web address]    Total Time = *** min, 50% of which was spent in counseling and coordination of care as documented above.    Scribe Disclosure:  Jorden REDDING, am serving as a scribe to document services personally performed by Jerome Valdez MD at this visit, based upon the provider's statements to me. All documentation has been reviewed by the aforementioned provider prior to being entered into the official medical record. ***    Jorden REDDING, a scribe, prepared the chart for today's encounter.         Answers for HPI/ROS submitted by the patient on 10/23/2019   General Symptoms: No  Skin Symptoms: No  HENT Symptoms: No  EYE SYMPTOMS: No  HEART SYMPTOMS: No  LUNG SYMPTOMS: No  INTESTINAL SYMPTOMS: No  URINARY SYMPTOMS: Yes  REPRODUCTIVE SYMPTOMS: No  SKELETAL SYMPTOMS: Yes  BLOOD SYMPTOMS: No  NERVOUS SYSTEM SYMPTOMS: Yes  MENTAL HEALTH SYMPTOMS: No  Trouble holding urine or incontinence: Yes  Pain or burning: No  Trouble starting or stopping: No  Increased frequency of urination: No  Blood in urine: No  Decreased frequency of urination: No  Frequent nighttime urination: Yes  Flank pain: No  Difficulty emptying bladder: Yes  Back pain: Yes  Muscle aches: Yes  Neck pain: No  Swollen joints: Yes  Joint pain: Yes  Bone pain: No  Muscle cramps: No  Muscle weakness: No  Joint stiffness: Yes  Bone fracture: No  Trouble with coordination: No  Dizziness or trouble with balance: No  Fainting or black-out spells: No  Memory loss: Yes  Headache: No  Seizures: No  Speech problems: No  Tingling: No  Tremor: No  Weakness:  No  Difficulty walking: Yes  Paralysis: No  Numbness: No      Again, thank you for allowing me to participate in the care of your patient.      Sincerely,    Jerome Valdez MD

## 2019-11-04 NOTE — NURSING NOTE
Chief Complaint   Patient presents with     RECHECK     F/u Left Hip Pain/Left Femur pain.        79 year old  1940             [id number] - [place], [place] - [address] [place] [address] W    No Known Allergies    Current Outpatient Medications   Medication     allopurinol (ZYLOPRIM) 300 MG tablet     lisinopril (PRINIVIL/ZESTRIL) 20 MG tablet     metoprolol succinate ER (TOPROL-XL) 50 MG 24 hr tablet     multivitamin w/minerals (THERA-VIT-M) tablet     omeprazole (PRILOSEC) 20 MG DR capsule     traMADol (ULTRAM) 50 MG tablet     vitamin B1 (THIAMINE) 100 MG tablet     No current facility-administered medications for this visit.            Questionnaires:    HOOS Hip Dysfunction & Osteoarthritis Outcome Questionnaire    Hip Dysfunction & Osteoarthritis Outcome Score (HOOS), English Version LK 2.0 (Edmond Thomason, Darlene WEINBERG, 2003) 11/4/2019   S1. Do you feel grinding, hear clicking or any other type of noise from your hip? Never   S2. Difficulties spreading legs wide apart None   S3. Difficulties to stride out when walking Moderate   S4. How severe is your hip joint stiffness after first wakening in the morning? Moderate   S5. How severe is your hip stiffness after sitting, lying or resting LATER IN THE DAY? Moderate   Symptom Count 5   Symptom Sum 6   Symptom Mean 1.2   Symptom Subscale Score 70   P1. How often is your hip painful? Daily   P2. Straightening your hip fully Moderate   P3. Bending your hip FULLY Moderate   P4. Walking on a flat surface Mild   P5. Going up or down stairs Severe   P6. At night while in bed None   P7. Sitting or lying None   P8. Standing upright Moderate   P9. Walking on a hard surface (asphalt, concrete, etc.) Moderate   P10. Walking on an uneven surface Moderate   Pain Count 10   Pain Sum 17   Pain Mean 1.7   Pain Subscale Score 57.5   A1. Descending stairs Extreme   A2. Ascending stairs Extreme   A3. Rising from sitting Mild   A4. Standing Mild   A5. Bending to the  floor/ an object Severe   A6. Walking on a flat surface Mild   A7. Getting in/out of car Mild   A8. Going shopping Moderate   A9. Putting on socks/stockings Mild   A10. Rising from bed Mild   A11. Taking off socks/stockings Moderate   A12. Lying in bed (turning over, maintaining hip position) Mild   A13. Getting in/out of bed Mild   A14. Sitting Mild   A15. Getting on/off toilet Mild   A16. Heavy domestic duties (moving heavy boxes, scrubbing floors, etc.) Extreme   A17. Light domestic duties (cooking, dusting, etc.) Moderate   ADL Count 17   ADL Sum 31   ADL Mean 1.82   ADL Subscale Score 54.41   SP1. Squatting Severe   SP2. Running Extreme   SP3. Twisting/pivoting on loaded leg Extreme   SP4. Walking on uneven surface Severe   Sports/Rec Count 4   Sports/Rec Sum 14   Sports/Rec Mean 3.5   Sports/Rec Subscale Score 12.5   Q1. How often are you aware of your hip problem? Constantly   Q2. Have you modified you life style to avoid activities potentially damaging to your hip? Totally   Q3. How much are you troubled with lack of confidence in your hip? Severely   Q4. In general, how much difficulty do you have with your hip? Moderate   QOL Count 4   QOL Sum 13   QOL Mean 3.25   Quality of Life Subscale Score 18.75                Promis 10 Assessment    PROMIS 10 11/4/2019   In general, would you say your health is: Fair   In general, would you say your quality of life is: Very good   In general, how would you rate your physical health? Good   In general, how would you rate your mental health, including your mood and your ability to think? Good   In general, how would you rate your satisfaction with your social activities and relationships? Very good   In general, please rate how well you carry out your usual social activities and roles Fair   To what extent are you able to carry out your everyday physical activities such as walking, climbing stairs, carrying groceries, or moving a chair? A little   How often have  you been bothered by emotional problems such as feeling anxious, depressed or irritable? Rarely   How would you rate your fatigue on average? Moderate   How would you rate your pain on average?   0 = No Pain  to  10 = Worst Imaginable Pain 4   In general, would you say your health is: 2   In general, would you say your quality of life is: 4   In general, how would you rate your physical health? 3   In general, how would you rate your mental health, including your mood and your ability to think? 3   In general, how would you rate your satisfaction with your social activities and relationships? 4   In general, please rate how well you carry out your usual social activities and roles. (This includes activities at home, at work and in your community, and responsibilities as a parent, child, spouse, employee, friend, etc.) 2   To what extent are you able to carry out your everyday physical activities such as walking, climbing stairs, carrying groceries, or moving a chair? 2   In the past 7 days, how often have you been bothered by emotional problems such as feeling anxious, depressed, or irritable? 2   In the past 7 days, how would you rate your fatigue on average? 3   In the past 7 days, how would you rate your pain on average, where 0 means no pain, and 10 means worst imaginable pain? 4   Global Mental Health Score 15   Global Physical Health Score 11   PROMIS TOTAL - SUBSCORES 26   Some recent data might be hidden

## 2019-11-04 NOTE — PROGRESS NOTES
[facility]  Orthopaedic Surgery, Joint Replacement Consultation  by Jerome Valdez M.D.    Varun Ramirez MRN# [id number]   Age: 79 year old YOB: 1940     Requesting physician: Referred Self  No Ref-Primary, Physician     Background history:  DX:  1. L femur periprosthetic fx     TREATMENTS:  1. 2005, Bilateral ARNOLDO (Carlitos Heeter) [place]  2. 2006, L ARNOLDO revision (Carlitos Heeter) [employer] [facility]-M2A Magnum system: [id number]  Press Fit Cup size 50mm (Ref [id number]), Magnum Tpr Adptr 42-50mm +6mm (Ref [id number]), M2a Magnum Modular Head Sz 44mm (Ref [id number]).    3. 7/2/2019, ORIF L femur periprosthetic fx (José Miguel) [facility]           History of Present Illness:   79 year old male 3 months s/p the above procedure who presents for postoperative follow-up. I last evaluated this patient on 9/30/19 and he reported persistent pain in the left mid-thigh. We elected to proceed with additional lab testing which did not show any significant elevation in CRP and the ESR.  No aspiration has been performed yet.  Today he reports that his complaints have significantly diminished.  The pain is well controlled.  Patient does not use any pain medication.  During his last visit patient was unable to partially weight-bear, currently he can fully weight-bear for short distances.  No fevers or wound drainage.  Patient's home physical therapy just ended.  He would like to continue physical therapy on outpatient basis.         Physical Exam:     EXAMINATION pertinent findings:   VITAL SIGNS: There were no vitals taken for this visit.  There is no height or weight on file to calculate BMI.  RESP: non labored breathing   NEURO: grossly normal   VASCULAR: satisfactory perfusion of all extremities   MUSCULOSKELETAL:   Slight Trendelenburg gait.  Flexion extension 95-0.  Pain-free rotations.  The incision is clean and dry.         Data:   All laboratory data reviewed  All imaging studies reviewed by me  Todays imaging shows no signs  of implant loosening or hardware failure.  No signs of fracture.            Assessment and Plan:   Assessment:  79-year-old male status post ORIF left periprosthetic femur fracture who is significantly improving over the past 4 weeks.  No signs of infection.     Plan:  We discussed the findings with the patient and his spouse.  We prescribed an additional physical therapy sessions for gait training, range of motion exercises and strength exercises.  If he continues to improve as the currently does we will follow-up with him 1 year after surgery.  We will see him sooner if there are any problems, questions or concerns.      Low Morales MD  [facility] Arthroplasty Fellow    Attending MD (Dr. Jerome Valdez) Attestation :  This patient was seen and evaluated by me including a history, exam, and interpretation of all imaging and/or lab data.  Either a training physician (resident/fellow), who also saw the patient, or scribe has documented the clinic visit in the attached note.    Jerome Valdez MD  [facility] Family Professor  Oncology and Adult Reconstructive Surgery  Dept Orthopaedic Surgery, [facility]  [phone] office, [phone] pager  [email or web address]    Total Time = 25 min, 50% of which was spent in counseling and coordination of care as documented above.    Scribe Disclosure:  IJorden, a scribe, prepared the chart for today's encounter.         Answers for HPI/ROS submitted by the patient on 10/23/2019   General Symptoms: No  Skin Symptoms: No  HENT Symptoms: No  EYE SYMPTOMS: No  HEART SYMPTOMS: No  LUNG SYMPTOMS: No  INTESTINAL SYMPTOMS: No  URINARY SYMPTOMS: Yes  REPRODUCTIVE SYMPTOMS: No  SKELETAL SYMPTOMS: Yes  BLOOD SYMPTOMS: No  NERVOUS SYSTEM SYMPTOMS: Yes  MENTAL HEALTH SYMPTOMS: No  Trouble holding urine or incontinence: Yes  Pain or burning: No  Trouble starting or stopping: No  Increased frequency of urination: No  Blood in urine: No  Decreased frequency of urination: No  Frequent  nighttime urination: Yes  Flank pain: No  Difficulty emptying bladder: Yes  Back pain: Yes  Muscle aches: Yes  Neck pain: No  Swollen joints: Yes  Joint pain: Yes  Bone pain: No  Muscle cramps: No  Muscle weakness: No  Joint stiffness: Yes  Bone fracture: No  Trouble with coordination: No  Dizziness or trouble with balance: No  Fainting or black-out spells: No  Memory loss: Yes  Headache: No  Seizures: No  Speech problems: No  Tingling: No  Tremor: No  Weakness: No  Difficulty walking: Yes  Paralysis: No  Numbness: No      Attending MD (Dr. Jerome Valdez) Attestation :  This patient was seen and evaluated by me including a history, exam, and interpretation of all imaging and/or lab data.  Either a training physician (resident/fellow), who also saw the patient, or scribe has documented the visit in the attached note.    MD Fallon Machuca Family Professor  Oncology and Adult Reconstructive Surgery  Dept Orthopaedic Surgery, [facility]  [phone] office, [phone] pager  [email or web address]

## 2019-12-15 ENCOUNTER — HEALTH MAINTENANCE LETTER (OUTPATIENT)
Age: 79
End: 2019-12-15

## 2020-01-22 ENCOUNTER — TRANSFERRED RECORDS (OUTPATIENT)
Dept: HEALTH INFORMATION MANAGEMENT | Facility: CLINIC | Age: 80
End: 2020-01-22

## 2020-04-06 ENCOUNTER — TRANSFERRED RECORDS (OUTPATIENT)
Dept: HEALTH INFORMATION MANAGEMENT | Facility: CLINIC | Age: 80
End: 2020-04-06

## 2020-11-16 ENCOUNTER — TRANSFERRED RECORDS (OUTPATIENT)
Dept: HEALTH INFORMATION MANAGEMENT | Facility: CLINIC | Age: 80
End: 2020-11-16

## 2020-11-17 ENCOUNTER — TELEPHONE (OUTPATIENT)
Dept: OPHTHALMOLOGY | Facility: CLINIC | Age: 80
End: 2020-11-17

## 2020-11-17 NOTE — TELEPHONE ENCOUNTER
Received referral from Dr. Don Trevizo for ectropion consultation with Dr. Lam. Left message for patient and gave scheduling number to call back for appt.     Maddie Pino, COA, 4:07 PM 11/17/2020

## 2020-11-30 ENCOUNTER — TRANSCRIBE ORDERS (OUTPATIENT)
Dept: OTHER | Age: 80
End: 2020-11-30

## 2020-11-30 DIAGNOSIS — H02.109 ECTROPION: Primary | ICD-10-CM

## 2020-12-09 ENCOUNTER — OFFICE VISIT (OUTPATIENT)
Dept: OPHTHALMOLOGY | Facility: CLINIC | Age: 80
End: 2020-12-09
Payer: COMMERCIAL

## 2020-12-09 DIAGNOSIS — H02.132 SENILE ECTROPION OF RIGHT LOWER EYELID: Primary | ICD-10-CM

## 2020-12-09 DIAGNOSIS — H02.135 SENILE ECTROPION OF LEFT LOWER EYELID: ICD-10-CM

## 2020-12-09 DIAGNOSIS — H02.403 INVOLUTIONAL PTOSIS, ACQUIRED, BILATERAL: ICD-10-CM

## 2020-12-09 PROCEDURE — 99203 OFFICE O/P NEW LOW 30 MIN: CPT | Performed by: OPHTHALMOLOGY

## 2020-12-09 PROCEDURE — 92285 EXTERNAL OCULAR PHOTOGRAPHY: CPT | Mod: 50 | Performed by: OPHTHALMOLOGY

## 2020-12-09 ASSESSMENT — VISUAL ACUITY
METHOD: SNELLEN - LINEAR
OD_SC: 20/30
OS_SC: 20/40
OD_SC+: +2

## 2020-12-09 NOTE — NURSING NOTE
Chief Complaints and History of Present Illnesses   Patient presents with     Ectropion Evaluation     Referral      Chief Complaint(s) and History of Present Illness(es)     Ectropion Evaluation     Associated signs and symptoms: Negative for eye pain, redness and eyelid swelling              Referral               Comments     Referral of ectropion consult per Dr. Osorio.  Patient says left eye drooping x 1 month.  Reduced vision left eye.  Eye meds: none  EDISON Gandhi 12/9/2020 9:16 AM

## 2020-12-09 NOTE — LETTER
12/9/2020         RE: Varun Ramirez  [address]  [place] [facility] [place] [address]        Dear Colleague,    Thank you for referring your patient, Varun Ramirez, to the [facility]. Please see a copy of my visit note below.         Chief Complaint(s) and History of Present Illness(es)     Ectropion Evaluation     Associated signs and symptoms: Negative for eye pain, redness and eyelid   swelling             Comments     Referral of ectropion consult per Dr. Osorio.  Patient says left eye drooping x 1 month.  Reduced vision left eye.  Feels like he has something, dirt in his left eye only.  Eye meds: none  Erikayahir Goldberg, [place] 12/9/2020 9:16 AM         Assessment & Plan     Varun Ramirez is a 80 year old male with the following diagnoses:   Encounter Diagnoses   Name Primary?     Senile ectropion of right lower eyelid Yes     Senile ectropion of left lower eyelid      Involutional ptosis, acquired, bilateral    Has negative malar vector with inferior scleral show.    Both lower eyelid ectropion repair. [id number]. Will need to hold Eliquis. Has a pacemaker.      Attending Physician Attestation: Complete documentation of historical and exam elements from today's encounter can be found in the full encounter summary report (not reduplicated in this progress note). I personally obtained the chief complaint(s) and history of present illness. I confirmed and edited as necessary the review of systems, past medical/surgical history, family history, social history, and examination findings as documented by others; and I examined the patient myself. I personally reviewed the relevant tests, images, and reports as documented above. I formulated and edited as necessary the assessment and plan and discussed the findings and management plan with the patient.  -Carole Ramirez MD          Again, thank you for allowing me to participate in the care of your patient.        Sincerely,      Carole Ramirez  MD    Oculoplastic and Orbital Surgery   Department of Ophthalmology and Visual Neurosciences  [facility]

## 2020-12-09 NOTE — PROGRESS NOTES
Chief Complaint(s) and History of Present Illness(es)     Ectropion Evaluation     Associated signs and symptoms: Negative for eye pain, redness and eyelid   swelling             Comments     Referral of ectropion consult per Dr. Osorio.  Patient says left eye drooping x 1 month.  Reduced vision left eye.  Feels like he has something, dirt in his left eye only.  Eye meds: none  Erikayahir Goldberg, [place] 12/9/2020 9:16 AM         Assessment & Plan     Varun Ramirez is a 80 year old male with the following diagnoses:   Encounter Diagnoses   Name Primary?     Senile ectropion of right lower eyelid Yes     Senile ectropion of left lower eyelid      Involutional ptosis, acquired, bilateral    Has negative malar vector with inferior scleral show.    Both lower eyelid ectropion repair. 78877. Will need to hold Eliquis. Has a pacemaker.      Attending Physician Attestation: Complete documentation of historical and exam elements from today's encounter can be found in the full encounter summary report (not reduplicated in this progress note). I personally obtained the chief complaint(s) and history of present illness. I confirmed and edited as necessary the review of systems, past medical/surgical history, family history, social history, and examination findings as documented by others; and I examined the patient myself. I personally reviewed the relevant tests, images, and reports as documented above. I formulated and edited as necessary the assessment and plan and discussed the findings and management plan with the patient.  -Carole Ramirez MD

## 2021-01-14 DIAGNOSIS — Z11.59 ENCOUNTER FOR SCREENING FOR OTHER VIRAL DISEASES: ICD-10-CM

## 2021-01-15 ENCOUNTER — HEALTH MAINTENANCE LETTER (OUTPATIENT)
Age: 81
End: 2021-01-15

## 2021-04-29 ENCOUNTER — TRANSFERRED RECORDS (OUTPATIENT)
Dept: HEALTH INFORMATION MANAGEMENT | Facility: CLINIC | Age: 81
End: 2021-04-29

## 2021-04-30 ENCOUNTER — TELEPHONE (OUTPATIENT)
Dept: OPHTHALMOLOGY | Facility: CLINIC | Age: 81
End: 2021-04-30

## 2021-04-30 NOTE — TELEPHONE ENCOUNTER
Authorization #: [id number]  Insurance Company: [employer]  Approval Dates: 4-27-21-12-31-21  Comments:  Fax sent to [facility]:     Aminata Gill, Surgery Scheduling Coordinator

## 2021-05-02 ENCOUNTER — ANESTHESIA EVENT (OUTPATIENT)
Dept: SURGERY | Facility: CLINIC | Age: 81
End: 2021-05-02
Payer: COMMERCIAL

## 2021-05-03 DIAGNOSIS — Z11.59 ENCOUNTER FOR SCREENING FOR OTHER VIRAL DISEASES: ICD-10-CM

## 2021-05-03 LAB
SARS-COV-2 RNA RESP QL NAA+PROBE: NORMAL
SPECIMEN SOURCE: NORMAL

## 2021-05-03 PROCEDURE — U0005 INFEC AGEN DETEC AMPLI PROBE: HCPCS | Performed by: OPHTHALMOLOGY

## 2021-05-03 PROCEDURE — U0003 INFECTIOUS AGENT DETECTION BY NUCLEIC ACID (DNA OR RNA); SEVERE ACUTE RESPIRATORY SYNDROME CORONAVIRUS 2 (SARS-COV-2) (CORONAVIRUS DISEASE [COVID-19]), AMPLIFIED PROBE TECHNIQUE, MAKING USE OF HIGH THROUGHPUT TECHNOLOGIES AS DESCRIBED BY CMS-2020-01-R: HCPCS | Performed by: OPHTHALMOLOGY

## 2021-05-04 LAB
LABORATORY COMMENT REPORT: NORMAL
SARS-COV-2 RNA RESP QL NAA+PROBE: NEGATIVE
SPECIMEN SOURCE: NORMAL

## 2021-05-06 ASSESSMENT — COPD QUESTIONNAIRES: COPD: 1

## 2021-05-06 ASSESSMENT — LIFESTYLE VARIABLES: TOBACCO_USE: 1

## 2021-05-06 ASSESSMENT — ENCOUNTER SYMPTOMS: DYSRHYTHMIAS: 1

## 2021-05-06 NOTE — ANESTHESIA PREPROCEDURE EVALUATION
Anesthesia Pre-Procedure Evaluation    Patient: Varun Ramirez   MRN: [id number] : 1940        Preoperative Diagnosis: Senile ectropion of right lower eyelid [H02.132]  Senile ectropion of left lower eyelid [H02.135]   Procedure : Procedure(s):  Bilateral ectropion repair     Past Medical History:   Diagnosis Date     Arthritis      Chronic osteoarthritis      Degenerative joint disease      Hearing problem      Hypertension      PONV (postoperative nausea and vomiting)      Shortness of breath       Past Surgical History:   Procedure Laterality Date     ARTHROPLASTY REVISION HIP Left 2019    Procedure: Open Reduction Internal Fixation of  Left Periprosthetic Femur Proximal, Left Revision Total Hip Arthroplasty;  Surgeon: Jerome Valdez MD;  Location: [facility]     C PELVIS/HIP JOINT SURGERY UNLISTED       C SHOULDER SURG PROC UNLISTED       IMPLANT PACEMAKER       KNEE SURGERY        No Known Allergies   Social History     Tobacco Use     Smoking status: Current Every Day Smoker     Packs/day: 0.00     Years: 10.00     Pack years: 0.00     Types: Cigars     Smokeless tobacco: Never Used     Tobacco comment: smoked cigarettes when young, hasn't smoked for at least 30+ years   Substance Use Topics     Alcohol use: Yes     Comment: Likes 1 beer daily and nikita (amount unknown)      Wt Readings from Last 1 Encounters:   19 111.1 kg (245 lb)        EKG 2019  Atrial-paced rhythm  Left axis deviation  Non-specific intra-ventricular conduction block  Abnormal ECG  When compared with ECG of 27-AUG-2007 13:20,  Electronic atrial pacemaker has replaced Sinus rhythm  Non-specific intra-ventricular conduction block has replaced Incomplete right bundle branch   block  Anesthesia Evaluation   Pt has had prior anesthetic.     History of anesthetic complications  - PONV.      ROS/MED HX  ENT/Pulmonary: Comment: Chronic bronchitis   Hearing loss  Ectropion    (+) sleep apnea, doesn't use CPAP, tobacco use,  Current use, COPD,     Neurologic: Comment: Restless leg syndrome  Balance issues - frequent falls   (-) no seizures, no CVA and migraines   Cardiovascular: Comment: Thoracic aortic aneurysm    (+) Dyslipidemia hypertension-----GARCIA. pacemaker, Reason placed: second degree AVB 2012. ICD dysrhythmias, a-flutter and a-fib,     METS/Exercise Tolerance:     Hematologic:  - neg hematologic  ROS     Musculoskeletal: Comment: Chronic low back pain  (+) arthritis,     GI/Hepatic:     (+) GERD,     Renal/Genitourinary:     (+) BPH,     Endo:    (-) Type II DM and thyroid disease   Psychiatric/Substance Use:     (+) alcohol abuse     Infectious Disease:       Malignancy:       Other:            Physical Exam    Airway        Mallampati: III   TM distance: > 3 FB   Neck ROM: full   Mouth opening: > 3 cm    Respiratory Devices and Support         Dental  no notable dental history         Cardiovascular   cardiovascular exam normal       Rhythm and rate: regular and normal     Pulmonary   pulmonary exam normal        breath sounds clear to auscultation           OUTSIDE LABS:  CBC:   Lab Results   Component Value Date    WBC 8.1 06/30/2019    HGB 11.1 (L) 07/04/2019    HGB 10.9 (L) 07/03/2019    HCT 40.2 06/30/2019     (L) 06/30/2019     BMP:   Lab Results   Component Value Date     07/03/2019     06/30/2019    POTASSIUM 4.1 07/03/2019    POTASSIUM 4.1 06/30/2019    CHLORIDE 105 07/03/2019    CHLORIDE 104 06/30/2019    CO2 26 07/03/2019    CO2 25 06/30/2019    BUN 19 07/03/2019    BUN 21 06/30/2019    CR 0.82 07/03/2019    CR 0.76 06/30/2019     (H) 07/03/2019     (H) 06/30/2019     COAGS:   Lab Results   Component Value Date    INR 1.10 06/30/2019     POC:   Lab Results   Component Value Date     (H) 07/02/2019     HEPATIC: No results found for: ALBUMIN, PROTTOTAL, ALT, AST, GGT, ALKPHOS, BILITOTAL, BILIDIRECT, WILLY  OTHER:   Lab Results   Component Value Date    GEORGE 8.3 (L) 07/03/2019     PHOS 3.6 07/01/2019    MAG 2.0 07/01/2019    CRP 13.4 (H) 09/30/2019    SED 44 (H) 09/30/2019       Anesthesia Plan    ASA Status:  3   NPO Status:  NPO Appropriate    Anesthesia Type: MAC.     - Reason for MAC: chronic cardiopulmonary disease, straight local not clinically adequate              Consents    Anesthesia Plan(s) and associated risks, benefits, and realistic alternatives discussed. Questions answered and patient/representative(s) expressed understanding.     - Discussed with:  Patient         Postoperative Care    Pain management: Multi-modal analgesia.   PONV prophylaxis: Ondansetron (or other 5HT-3), Dexamethasone or Solumedrol     Comments:         H&P reviewed: Unable to attach H&P to encounter due to EHR limitations. H&P Update: appropriate H&P reviewed, patient examined, interval changes since H&P (within 30 days) include:         Yury Montero MD

## 2021-05-07 ENCOUNTER — ANESTHESIA (OUTPATIENT)
Dept: SURGERY | Facility: CLINIC | Age: 81
End: 2021-05-07
Payer: COMMERCIAL

## 2021-05-07 ENCOUNTER — HOSPITAL ENCOUNTER (OUTPATIENT)
Facility: CLINIC | Age: 81
Discharge: HOME | End: 2021-05-07
Attending: OPHTHALMOLOGY | Admitting: OPHTHALMOLOGY
Payer: COMMERCIAL

## 2021-05-07 VITALS
OXYGEN SATURATION: 97 % | SYSTOLIC BLOOD PRESSURE: 138 MMHG | DIASTOLIC BLOOD PRESSURE: 76 MMHG | WEIGHT: 258.5 LBS | TEMPERATURE: 97.1 F | RESPIRATION RATE: 18 BRPM | HEIGHT: 72 IN | BODY MASS INDEX: 35.01 KG/M2 | HEART RATE: 63 BPM

## 2021-05-07 DIAGNOSIS — H02.132 SENILE ECTROPION OF RIGHT LOWER EYELID: ICD-10-CM

## 2021-05-07 DIAGNOSIS — H02.135 SENILE ECTROPION OF LEFT LOWER EYELID: ICD-10-CM

## 2021-05-07 PROCEDURE — 370N000017 HC ANESTHESIA TECHNICAL FEE, PER MIN: Performed by: OPHTHALMOLOGY

## 2021-05-07 PROCEDURE — 710N000009 HC RECOVERY PHASE 1, LEVEL 1, PER MIN: Performed by: OPHTHALMOLOGY

## 2021-05-07 PROCEDURE — 250N000009 HC RX 250: Performed by: OPHTHALMOLOGY

## 2021-05-07 PROCEDURE — 250N000011 HC RX IP 250 OP 636: Performed by: NURSE ANESTHETIST, CERTIFIED REGISTERED

## 2021-05-07 PROCEDURE — 710N000012 HC RECOVERY PHASE 2, PER MINUTE: Performed by: OPHTHALMOLOGY

## 2021-05-07 PROCEDURE — 272N000001 HC OR GENERAL SUPPLY STERILE: Performed by: OPHTHALMOLOGY

## 2021-05-07 PROCEDURE — 250N000009 HC RX 250: Performed by: NURSE ANESTHETIST, CERTIFIED REGISTERED

## 2021-05-07 PROCEDURE — 360N000076 HC SURGERY LEVEL 3, PER MIN: Performed by: OPHTHALMOLOGY

## 2021-05-07 PROCEDURE — 999N000141 HC STATISTIC PRE-PROCEDURE NURSING ASSESSMENT: Performed by: OPHTHALMOLOGY

## 2021-05-07 PROCEDURE — 258N000003 HC RX IP 258 OP 636: Performed by: NURSE ANESTHETIST, CERTIFIED REGISTERED

## 2021-05-07 RX ORDER — FENTANYL CITRATE 0.05 MG/ML
25-50 INJECTION, SOLUTION INTRAMUSCULAR; INTRAVENOUS
Status: DISCONTINUED | OUTPATIENT
Start: 2021-05-07 | End: 2021-05-07 | Stop reason: HOSPADM

## 2021-05-07 RX ORDER — ERYTHROMYCIN 5 MG/G
OINTMENT OPHTHALMIC PRN
Status: DISCONTINUED | OUTPATIENT
Start: 2021-05-07 | End: 2021-05-07 | Stop reason: HOSPADM

## 2021-05-07 RX ORDER — TETRACAINE HYDROCHLORIDE 5 MG/ML
SOLUTION OPHTHALMIC PRN
Status: DISCONTINUED | OUTPATIENT
Start: 2021-05-07 | End: 2021-05-07 | Stop reason: HOSPADM

## 2021-05-07 RX ORDER — PROPOFOL 10 MG/ML
INJECTION, EMULSION INTRAVENOUS CONTINUOUS PRN
Status: DISCONTINUED | OUTPATIENT
Start: 2021-05-07 | End: 2021-05-07

## 2021-05-07 RX ORDER — NEOMYCIN POLYMYXIN B SULFATES AND DEXAMETHASONE 3.5; 10000; 1 MG/ML; [USP'U]/ML; MG/ML
SUSPENSION/ DROPS OPHTHALMIC
Qty: 5 ML | Refills: 0 | Status: SHIPPED | OUTPATIENT
Start: 2021-05-07 | End: 2021-05-19

## 2021-05-07 RX ORDER — NALOXONE HYDROCHLORIDE 0.4 MG/ML
0.4 INJECTION, SOLUTION INTRAMUSCULAR; INTRAVENOUS; SUBCUTANEOUS
Status: DISCONTINUED | OUTPATIENT
Start: 2021-05-07 | End: 2021-05-07 | Stop reason: HOSPADM

## 2021-05-07 RX ORDER — BUPIVACAINE HYDROCHLORIDE 5 MG/ML
INJECTION, SOLUTION EPIDURAL; INTRACAUDAL
Status: DISCONTINUED
Start: 2021-05-07 | End: 2021-05-07 | Stop reason: HOSPADM

## 2021-05-07 RX ORDER — ONDANSETRON 2 MG/ML
4 INJECTION INTRAMUSCULAR; INTRAVENOUS EVERY 30 MIN PRN
Status: DISCONTINUED | OUTPATIENT
Start: 2021-05-07 | End: 2021-05-07 | Stop reason: HOSPADM

## 2021-05-07 RX ORDER — SODIUM CHLORIDE, SODIUM LACTATE, POTASSIUM CHLORIDE, CALCIUM CHLORIDE 600; 310; 30; 20 MG/100ML; MG/100ML; MG/100ML; MG/100ML
INJECTION, SOLUTION INTRAVENOUS CONTINUOUS
Status: DISCONTINUED | OUTPATIENT
Start: 2021-05-07 | End: 2021-05-07 | Stop reason: HOSPADM

## 2021-05-07 RX ORDER — ONDANSETRON 4 MG/1
4 TABLET, ORALLY DISINTEGRATING ORAL EVERY 30 MIN PRN
Status: DISCONTINUED | OUTPATIENT
Start: 2021-05-07 | End: 2021-05-07 | Stop reason: HOSPADM

## 2021-05-07 RX ORDER — ERYTHROMYCIN 5 MG/G
OINTMENT OPHTHALMIC
Status: DISCONTINUED
Start: 2021-05-07 | End: 2021-05-07 | Stop reason: HOSPADM

## 2021-05-07 RX ORDER — PROPOFOL 10 MG/ML
INJECTION, EMULSION INTRAVENOUS PRN
Status: DISCONTINUED | OUTPATIENT
Start: 2021-05-07 | End: 2021-05-07

## 2021-05-07 RX ORDER — LIDOCAINE HYDROCHLORIDE 20 MG/ML
INJECTION, SOLUTION INFILTRATION; PERINEURAL PRN
Status: DISCONTINUED | OUTPATIENT
Start: 2021-05-07 | End: 2021-05-07

## 2021-05-07 RX ORDER — ERYTHROMYCIN 5 MG/G
OINTMENT OPHTHALMIC
Qty: 3.5 G | Refills: 0 | Status: SHIPPED | OUTPATIENT
Start: 2021-05-07 | End: 2021-05-19

## 2021-05-07 RX ORDER — MEPERIDINE HYDROCHLORIDE 25 MG/ML
12.5 INJECTION INTRAMUSCULAR; INTRAVENOUS; SUBCUTANEOUS
Status: DISCONTINUED | OUTPATIENT
Start: 2021-05-07 | End: 2021-05-07 | Stop reason: HOSPADM

## 2021-05-07 RX ORDER — HYDROMORPHONE HYDROCHLORIDE 1 MG/ML
.3-.5 INJECTION, SOLUTION INTRAMUSCULAR; INTRAVENOUS; SUBCUTANEOUS EVERY 10 MIN PRN
Status: DISCONTINUED | OUTPATIENT
Start: 2021-05-07 | End: 2021-05-07 | Stop reason: HOSPADM

## 2021-05-07 RX ORDER — NALOXONE HYDROCHLORIDE 0.4 MG/ML
0.2 INJECTION, SOLUTION INTRAMUSCULAR; INTRAVENOUS; SUBCUTANEOUS
Status: DISCONTINUED | OUTPATIENT
Start: 2021-05-07 | End: 2021-05-07 | Stop reason: HOSPADM

## 2021-05-07 RX ORDER — ONDANSETRON 2 MG/ML
INJECTION INTRAMUSCULAR; INTRAVENOUS PRN
Status: DISCONTINUED | OUTPATIENT
Start: 2021-05-07 | End: 2021-05-07

## 2021-05-07 RX ORDER — TETRACAINE HYDROCHLORIDE 5 MG/ML
SOLUTION OPHTHALMIC
Status: DISCONTINUED
Start: 2021-05-07 | End: 2021-05-07 | Stop reason: HOSPADM

## 2021-05-07 RX ORDER — SODIUM CHLORIDE, SODIUM LACTATE, POTASSIUM CHLORIDE, CALCIUM CHLORIDE 600; 310; 30; 20 MG/100ML; MG/100ML; MG/100ML; MG/100ML
INJECTION, SOLUTION INTRAVENOUS CONTINUOUS PRN
Status: DISCONTINUED | OUTPATIENT
Start: 2021-05-07 | End: 2021-05-07

## 2021-05-07 RX ADMIN — LIDOCAINE HYDROCHLORIDE 40 MG: 20 INJECTION, SOLUTION INFILTRATION; PERINEURAL at 07:42

## 2021-05-07 RX ADMIN — ONDANSETRON 4 MG: 2 INJECTION INTRAMUSCULAR; INTRAVENOUS at 07:39

## 2021-05-07 RX ADMIN — PROPOFOL 50 MG: 10 INJECTION, EMULSION INTRAVENOUS at 07:42

## 2021-05-07 RX ADMIN — SODIUM CHLORIDE, POTASSIUM CHLORIDE, SODIUM LACTATE AND CALCIUM CHLORIDE: 600; 310; 30; 20 INJECTION, SOLUTION INTRAVENOUS at 07:30

## 2021-05-07 RX ADMIN — PROPOFOL 100 MCG/KG/MIN: 10 INJECTION, EMULSION INTRAVENOUS at 07:42

## 2021-05-07 ASSESSMENT — ENCOUNTER SYMPTOMS: SEIZURES: 0

## 2021-05-07 ASSESSMENT — MIFFLIN-ST. JEOR: SCORE: 1920.55

## 2021-05-07 NOTE — DISCHARGE INSTRUCTIONS
Same Day Surgery Discharge Instructions for  Sedation and General Anesthesia       It's not unusual to feel dizzy, light-headed or faint for up to 24 hours after surgery or while taking pain medication.  If you have these symptoms: sit for a few minutes before standing and have someone assist you when you get up to walk or use the bathroom.      You should rest and relax for the next 24 hours. We recommend you make arrangements to have an adult stay with you for at least 24 hours after your discharge.  Avoid hazardous and strenuous activity.      DO NOT DRIVE any vehicle or operate mechanical equipment for 24 hours following the end of your surgery.  Even though you may feel normal, your reactions may be affected by the medication you have received.      Do not drink alcoholic beverages for 24 hours following surgery.       Slowly progress to your regular diet as you feel able. It's not unusual to feel nauseated and/or vomit after receiving anesthesia.  If you develop these symptoms, drink clear liquids (apple juice, ginger ale, broth, 7-up, etc. ) until you feel better.  If your nausea and vomiting persists for 24 hours, please notify your surgeon.        All narcotic pain medications, along with inactivity and anesthesia, can cause constipation. Drinking plenty of liquids and increasing fiber intake will help.      For any questions of a medical nature, call your surgeon.      Do not make important decisions for 24 hours.      If you had general anesthesia, you may have a sore throat for a couple of days related to the breathing tube used during surgery.  You may use Cepacol lozenges to help with this discomfort.  If it worsens or if you develop a fever, contact your surgeon.       If you feel your pain is not well managed with the pain medications prescribed by your surgeon, please contact your surgeon's office to let them know so they can address your concerns.       CoVid 19 Information    We want to give you  information regarding Covid. Please consult your primary care provider with any questions you might have.     Patient who have symptoms (cough, fever, or shortness of breath), need to isolate for 7 days from when symptoms started OR 72 hours after fever resolves (without fever reducing medications) AND improvement of respiratory symptoms (whichever is longer).      Isolate yourself at home (in own room/own bathroom if possible)    Do Not allow any visitors    Do Not go to work or school    Do Not go to [employer],  centers, shopping, or other public places.    Do Not shake hands.    Avoid close and intimate contact with others (hugging, kissing).    Follow CDC recommendations for household cleaning of frequently touched services.     After the initial 7 days, continue to isolate yourself from household members as much as possible. To continue decrease the risk of community spread and exposure, you and any members of your household should limit activities in public for 14 days after starting home isolation.     You can reference the following CDC link for helpful home isolation/care tips:  https://www.cdc.gov/coronavirus/2019-ncov/downloads/10Things.pdf    Protect Others:    Cover Your Mouth and Nose with a mask, disposable tissue or wash cloth to avoid spreading germs to others.    Wash your hands and face frequently with soap and water    Call Your Primary Doctor If: Breathing difficulty develops or you become worse.    For more information about COVID19 and options for caring for yourself at home, please visit the CDC website at https://www.cdc.gov/coronavirus/2019-ncov/about/steps-when-sick.html  For more options for care at [facility], please visit our website at [email or web address]            Post-operative Instructions  Ophthalmic Plastic and Reconstructive Surgery    Carole Ramirez M.D.     All instructions apply to the operated eye(s) or eyelid(s).    Wound care  and personal care  ? Apply ice compresses 15 minutes of every hour while awake for 2 days. As long as there is no further bleeding, after two days, switch to warm water compresses for five minutes, four times a day until seen by your physician.   ? You may shower or wash your hair the day after surgery. Do not go swimming for at least 2 weeks to prevent contamination of your wounds.  ? Do not apply make-up to the eyes or eyelids for 2 weeks after surgery.  ? Expect some swelling, bruising, black eye (even into the lower eyelids and cheeks). Also expect serum caking, crusting and discharge from the eye and/or incisions. A small amount of surface bleeding, and depending on the type of surgery, bleeding from the inside of the eyelid, is normal for the first 48 hours.  ? Avoid straining, bending at the waist, or lifting more than 15 pounds for 10 days. Activities that raise your blood pressure can worsen swelling, cause bleeding, and breaking of sutures. Like wise, sleeping with your head slightly elevated for the first several days can help swelling resolve more quickly.   ? Do continue to ambulate (walk) as you normally would - being sedentary after surgery can cause blood clots.   ? Your eye(s) and eyelid(s) may be painful and tender. This is normal after surgery.      Contact information and follow-up  ? Return to the Eye Clinic for a follow-up appointment with your physician as scheduled. If no appointment has been scheduled:   - [facility] eye clinic: [phone] for an appointment with Dr. Ramirez within 1 to 2 weeks from your date of surgery. If you are scheduled for a phone or video visit for your first postoperative appointment, please e-mail pictures to [email or web address] 1-2 days before your appointment.   -  [facility] eye clinic: [phone] for an appointment with Dr. Ramirez within 1 to 2 weeks from your date of surgery.     ? For severe pain, bleeding, or loss of  vision, call the [facility] at [phone] or [facility] at [phone].     After hours or on weekends and holidays, call [phone] and ask to speak with the ophthalmologist on call.    An on call person can be reached after hours for concerns. The on call doctor should not call in medication refill requests after hours or on weekends, so please plan accordingly. An effort has been made to provide adequate pain medications following every surgery, and refills will not be provided in most instances. Narcotic pain medications cannot be called in.     Activity restrictions and driving  ? Avoid heavy lifting, bending, exercise or strenuous activity for 1 week after surgery. You may resume other activities and return to work as tolerated.    Medications  ? Restart all regular home medications and eye drops. For the Eliquis, please start this back on Sunday, May 9th  ? Avoid aspirin and aspirin-like medications (Motrin, Aleve, Ibuprofen, [place] etc) for 72 hours to reduce the risk of bleeding. You may take Tylenol (acetaminophen) for pain.  ? In addition to your home medications, take the following post-operative medications as prescribed by your physician.    ? Apply antibiotic ointment to all sutures three times a day, and into the operated eye(s) at night. Use until follow up.  ? Instill eye drops 3 times a day until follow up.

## 2021-05-07 NOTE — ANESTHESIA CARE TRANSFER NOTE
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    Anesthesia Type:   MAC     Note:    Oropharynx: oropharynx clear of all foreign objects and spontaneously breathing  Level of Consciousness: drowsy  Oxygen Supplementation: nasal cannula  Level of Supplemental Oxygen (L/min / FiO2): 4  Independent Airway: airway patency satisfactory and stable  Dentition: dentition unchanged  Vital Signs Stable: post-procedure vital signs reviewed and stable  Report to RN Given: handoff report given  Patient transferred to: PACU  Comments: At end of procedure, spontaneous respirations, patient alert to voice, able to follow commands. Oxygen via nasal cannula at 4 liters per minute to PACU. Oxygen tubing connected to wall O2 in PACU, SpO2, NiBP, and EKG monitors and alarms on and functioning, Bharat Hugger warmer connected to patient gown, report on patient's clinical status given to PACU RN, RN questions answered.  Handoff Report: Identifed the Patient, Identified the Reponsible Provider, Reviewed the pertinent medical history, Discussed the surgical course, Reviewed Intra-OP anesthesia mangement and issues during anesthesia, Set expectations for post-procedure period and Allowed opportunity for questions and acknowledgement of understanding      Vitals: (Last set prior to Anesthesia Care Transfer)  CRNA VITALS  5/7/2021 0732 - 5/7/2021 0807      5/7/2021             Pulse:  63    SpO2:  98 %    Resp Rate (set):  10        Electronically Signed By: ABILIO Dsouza CRNA  May 7, 2021  8:07 AM

## 2021-05-07 NOTE — BRIEF OP NOTE
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  Complications: None.  Implants: * No implants in log *    Barry Duran MD  Department of Ophthalmology  Pager: [phone]

## 2021-05-07 NOTE — ANESTHESIA POSTPROCEDURE EVALUATION
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    Anesthesia Type:  MAC    Note:  Disposition: Outpatient   Postop Pain Control: Uneventful            Sign Out: Well controlled pain   PONV: No   Neuro/Psych: Uneventful            Sign Out: Acceptable/Baseline neuro status   Airway/Respiratory: Uneventful            Sign Out: Acceptable/Baseline resp. status   CV/Hemodynamics: Uneventful            Sign Out: Acceptable CV status; No obvious hypovolemia; No obvious fluid overload   Other NRE: NONE   DID A NON-ROUTINE EVENT OCCUR? No           Last vitals:  Vitals:    05/07/21 0930 05/07/21 0945 05/07/21 1015   BP: 139/71 (!) 146/68 138/76   Pulse: 61 63    Resp: 11 13 18   Temp:      SpO2: 96% 97% 97%       Last vitals prior to Anesthesia Care Transfer:  CRNA VITALS  5/7/2021 0732 - 5/7/2021 0832      5/7/2021             Pulse:  63    SpO2:  98 %    Resp Rate (set):  10          Electronically Signed By: Yury Montero MD  May 7, 2021  11:14 AM

## 2021-05-07 NOTE — OP NOTE
PREOPERATIVE DIAGNOSIS: Bilateral lower eyelid ectropion  POSTOPERATIVE DIAGNOSIS: Bilateral lower eyelid ectropion  PROCEDURE:  Bilateral lower eyelid ectropion repair by tarsal strip procedure   ANESTHESIA: Monitored with local infiltration of a 50/50 mixture of 2% lidocaine with epinephrine and 0.5% Marcaine.   SURGEON: Carole Ramirez MD.  ASSISTANT: Rocky Pro MD   COMPLICATIONS: None.   ESTIMATED BLOOD LOSS: Less than 5 mL.   HISTORY: Varun Ramirez  presented with tearing  due to lower lid ectropion. After the risks, benefits and alternatives to the proposed procedure were explained, informed consent was obtained.   DESCRIPTION OF PROCEDURE: Varun Ramirez was brought to the operating room and placed supine on the operating table. IV sedation was given. The lower lids and lateral canthal areas were infiltrated with local anesthetic. The area was prepped and draped in the typical fashion. Attention was directed to the right side. Lateral canthotomy and inferior cantholysis was performed with Virgen scissors. A lateral tarsal strip was fashioned with the high temperature cautery and the virgen scissors. The tarsal strip was secured to the lateral orbital rim periosteum with a double-armed 5-0 PDS suture in a horizontal mattress fashion. Lateral canthal angle was closed with a gray line to gray line suture of 5-0 Vicryl tied internally. The patient tolerated the procedure well. Attention was directed to the left side where the same procedure was performed. Antibiotic ointment was applied to the incisions. Varnu Ramirez left the operating room in stable condition.   Carole Ramirez MD

## 2021-05-19 ENCOUNTER — OFFICE VISIT (OUTPATIENT)
Dept: OPHTHALMOLOGY | Facility: CLINIC | Age: 81
End: 2021-05-19
Payer: COMMERCIAL

## 2021-05-19 DIAGNOSIS — H02.403 INVOLUTIONAL PTOSIS, ACQUIRED, BILATERAL: ICD-10-CM

## 2021-05-19 DIAGNOSIS — H02.135 SENILE ECTROPION OF LEFT LOWER EYELID: ICD-10-CM

## 2021-05-19 DIAGNOSIS — H02.132 SENILE ECTROPION OF RIGHT LOWER EYELID: Primary | ICD-10-CM

## 2021-05-19 PROCEDURE — 99024 POSTOP FOLLOW-UP VISIT: CPT | Performed by: OPHTHALMOLOGY

## 2021-05-19 ASSESSMENT — VISUAL ACUITY
OS_SC: 20/40
METHOD: SNELLEN - LINEAR
OD_SC: 20/25

## 2021-05-19 ASSESSMENT — TONOMETRY
OD_IOP_MMHG: 14
OS_IOP_MMHG: 15
IOP_METHOD: ICARE

## 2021-05-19 NOTE — PROGRESS NOTES
Chief Complaint(s) and History of Present Illness(es)     Post Op (Ophthalmology) Both Eyes     Laterality: both eyes              Comments     S/p bilateral lower eyelid ectropion repair 5/7/21. Pt feels he cannot   see well following the procedure. Ran out of ointment this morning.   Continues to use maxitrol drops.         Doing well postop bilateral ectropion repair.     Cannot get artificial tears in.  warm packs three times a day   Return to clinic in 2 months.  Can consider addressing upper eyelids, but would worsen dry eye     Attending Physician Attestation: Complete documentation of historical and exam elements from today's encounter can be found in the full encounter summary report (not reduplicated in this progress note). I personally obtained the chief complaint(s) and history of present illness. I confirmed and edited as necessary the review of systems, past medical/surgical history, family history, social history, and examination findings as documented by others; and I examined the patient myself. I personally reviewed the relevant tests, images, and reports as documented above. I formulated and edited as necessary the assessment and plan and discussed the findings and management plan with the patient and family. I personally reviewed the ophthalmic test(s) associated with this encounter, agree with the interpretation(s) as documented by the resident/fellow, and have edited the corresponding report(s) as necessary. Carole Ramirez MD

## 2021-05-19 NOTE — NURSING NOTE
Chief Complaints and History of Present Illnesses   Patient presents with     Post Op (Ophthalmology) Both Eyes       Chief Complaint(s) and History of Present Illness(es)     Post Op (Ophthalmology) Both Eyes     Laterality: both eyes              Comments     S/p bilateral lower eyelid ectropion repair 5/7/21. Pt feels he cannot see well following the procedure. Ran out of ointment this morning. Continues to use maxitrol drops.                Maddie Pino, COA

## 2021-07-28 ENCOUNTER — OFFICE VISIT (OUTPATIENT)
Dept: OPHTHALMOLOGY | Facility: CLINIC | Age: 81
End: 2021-07-28
Payer: COMMERCIAL

## 2021-07-28 DIAGNOSIS — H02.132 SENILE ECTROPION OF RIGHT LOWER EYELID: Primary | ICD-10-CM

## 2021-07-28 DIAGNOSIS — H02.135 SENILE ECTROPION OF LEFT LOWER EYELID: ICD-10-CM

## 2021-07-28 DIAGNOSIS — H53.10 SUBJECTIVE VISUAL DISTURBANCE: ICD-10-CM

## 2021-07-28 DIAGNOSIS — H02.403 INVOLUTIONAL PTOSIS, ACQUIRED, BILATERAL: ICD-10-CM

## 2021-07-28 PROCEDURE — 99024 POSTOP FOLLOW-UP VISIT: CPT | Performed by: OPHTHALMOLOGY

## 2021-07-28 RX ORDER — GLYCOPYRROLATE AND FORMOTEROL FUMARATE 9; 4.8 UG/1; UG/1
2 AEROSOL, METERED RESPIRATORY (INHALATION) DAILY
COMMUNITY
Start: 2021-07-07

## 2021-07-28 ASSESSMENT — VISUAL ACUITY
OS_SC+: -1
METHOD: SNELLEN - LINEAR
OS_SC: 20/50
OD_SC: 20/20

## 2021-07-28 ASSESSMENT — TONOMETRY
OS_IOP_MMHG: 12
OD_IOP_MMHG: 11
IOP_METHOD: ICARE

## 2021-07-28 NOTE — LETTER
2021         RE:  :  MRN: Varun Ramirez  1940  [id number]     Dear Dr. Osorio,    Our mutual patient, Varun Ramirez, returned for oculoplastic follow up. My assessment and plan are below.  For further details, please see my attached clinic note.      Chief Complaint(s) and History of Present Illness(es)     Post Op (Ophthalmology) Both Eyes     Laterality: both eyes              Comments     S/p bilateral lower eyelid ectropion repair 21. Pt feels vision is   off, has felt that was since before the surgery, left worse than right.   Lower right temporal corner is sore all the time. Using Refresh [place]   drops-not very regularly.         Assessment & Plan     Varun Ramirez is a 80 year old male with the following diagnoses:   Encounter Diagnoses   Name Primary?     Senile ectropion of right lower eyelid Yes     Senile ectropion of left lower eyelid      Involutional ptosis, acquired, bilateral      Subjective visual disturbance      Bilateral lower lids ectropion s/p ectropion repair. We reviewed his pre/post photos. His lower eyelid now has good apposition to the globe, and the inferior conjunctival injection has resolved. He does have negative malar vector and as such has mild inferior scleral show.     His ptosis appears visually significant worse on the left than the right. He also complains of blurry vision. In clinic I asked him to lift his eyelids and he didn't feel that cleared things up.     I have asked him to increase his use of artificial tears to 4-5 x daily as he continues to have inferior punctate epithelial erosions, lift his lids intermittently when he notices blurry vision to see if it helps. If it does, could consider ptosis repair. Finally can see Dr. Osorio to evaluate for other causes of blurry vision.           Again, thank you for allowing me to participate in the care of your patient.      Sincerely,    Carole Ramirez MD    Oculoplastic and Orbital  Surgery   Department of Ophthalmology and Visual Neurosciences  [facility]                CC: Don Osorio MD  [facility]  [address] [address]  Via Fax: [phone]

## 2021-07-28 NOTE — NURSING NOTE
Chief Complaints and History of Present Illnesses   Patient presents with     Post Op (Ophthalmology) Both Eyes       Chief Complaint(s) and History of Present Illness(es)     Post Op (Ophthalmology) Both Eyes     Laterality: both eyes              Comments     S/p bilateral lower eyelid ectropion repair 5/7/21. Pt feels vision is off, has felt that was since before the surgery, left worse than right. Lower right temporal corner is sore all the time. Using Refresh [place] drops-not very regularly.                Maddie Pino, COA

## 2021-07-28 NOTE — PROGRESS NOTES
Chief Complaint(s) and History of Present Illness(es)     Post Op (Ophthalmology) Both Eyes     Laterality: both eyes              Comments     S/p bilateral lower eyelid ectropion repair 5/7/21. Pt feels vision is   off, has felt that was since before the surgery, left worse than right.   Lower right temporal corner is sore all the time. Using Refresh [place]   drops-not very regularly.         Assessment & Plan     Varun Ramirez is a 80 year old male with the following diagnoses:   Encounter Diagnoses   Name Primary?     Senile ectropion of right lower eyelid Yes     Senile ectropion of left lower eyelid      Involutional ptosis, acquired, bilateral      Subjective visual disturbance      Bilateral lower lids ectropion s/p ectropion repair. We reviewed his pre/post photos. His lower eyelid now has good apposition to the globe, and the inferior conjunctival injection has resolved. He does have negative malar vector and as such has mild inferior scleral show.     His ptosis appears visually significant worse on the left than the right. He also complains of blurry vision. In clinic I asked him to lift his eyelids and he didn't feel that cleared things up.     I have asked him to increase his use of artificial tears to 4-5 x daily as he continues to have inferior punctate epithelial erosions, lift his lids intermittently when he notices blurry vision to see if it helps. If it does, could consider ptosis repair. Finally can see Dr. Osorio to evaluate for other causes of blurry vision.          Attending Physician Attestation: Complete documentation of historical and exam elements from today's encounter can be found in the full encounter summary report (not reduplicated in this progress note). I personally obtained the chief complaint(s) and history of present illness. I confirmed and edited as necessary the review of systems, past medical/surgical history, family history, social history, and examination findings as  documented by others; and I examined the patient myself. I personally reviewed the relevant tests, images, and reports as documented above. I formulated and edited as necessary the assessment and plan and discussed the findings and management plan with the patient.  -Carole Ramirez MD

## 2021-10-24 ENCOUNTER — HEALTH MAINTENANCE LETTER (OUTPATIENT)
Age: 81
End: 2021-10-24

## 2022-02-13 ENCOUNTER — HEALTH MAINTENANCE LETTER (OUTPATIENT)
Age: 82
End: 2022-02-13

## 2022-06-01 ENCOUNTER — OFFICE VISIT (OUTPATIENT)
Dept: OPHTHALMOLOGY | Facility: CLINIC | Age: 82
End: 2022-06-01
Payer: COMMERCIAL

## 2022-06-01 DIAGNOSIS — H04.129 DRY EYE: ICD-10-CM

## 2022-06-01 DIAGNOSIS — H02.539 EYELID RETRACTION OR LAG: Primary | ICD-10-CM

## 2022-06-01 PROCEDURE — 99213 OFFICE O/P EST LOW 20 MIN: CPT | Mod: GC | Performed by: OPHTHALMOLOGY

## 2022-06-01 RX ORDER — TAMSULOSIN HYDROCHLORIDE 0.4 MG/1
CAPSULE ORAL
COMMUNITY
Start: 2022-05-27

## 2022-06-01 RX ORDER — FUROSEMIDE 40 MG
TABLET ORAL
COMMUNITY
Start: 2022-03-27

## 2022-06-01 RX ORDER — AMIODARONE HYDROCHLORIDE 200 MG/1
TABLET ORAL
COMMUNITY
Start: 2022-03-11

## 2022-06-01 ASSESSMENT — VISUAL ACUITY
OS_SC: 20/50
METHOD: SNELLEN - LINEAR
OD_SC: 20/20
OD_SC+: -1

## 2022-06-01 ASSESSMENT — LEVATOR FUNCTION
OS_LEVATOR: 16
OD_LEVATOR: 16

## 2022-06-01 ASSESSMENT — CONF VISUAL FIELD
OS_SUPERIOR_NASAL_RESTRICTION: 3
OD_NORMAL: 1
METHOD: COUNTING FINGERS

## 2022-06-01 ASSESSMENT — EXTERNAL EXAM - LEFT EYE: OS_EXAM: NORMAL

## 2022-06-01 ASSESSMENT — TONOMETRY
OS_IOP_MMHG: 14
IOP_METHOD: TONOPEN
OD_IOP_MMHG: 16

## 2022-06-01 ASSESSMENT — MARGIN REFLEX DISTANCE
OS_MRD1: 0.5
OD_MRD1: 1

## 2022-06-01 ASSESSMENT — CUP TO DISC RATIO
OS_RATIO: 0.8
OD_RATIO: 0.7

## 2022-06-01 ASSESSMENT — EXTERNAL EXAM - RIGHT EYE: OD_EXAM: NORMAL

## 2022-06-01 NOTE — NURSING NOTE
Chief Complaints and History of Present Illnesses   Patient presents with     Follow Up      Senile ectropion of right lower eyelid Yes    Senile ectropion of left lower eyelid      Involutional ptosis, acquired, bilateral      Chief Complaint(s) and History of Present Illness(es)     Follow Up     Associated symptoms: dryness (left eye, mild).  Negative for eye pain, tearing and swelling    Treatments tried: artificial tears    Pain scale: 0/10    Comments:  Senile ectropion of right lower eyelid Yes    Senile ectropion of left lower eyelid      Involutional ptosis, acquired, bilateral               Comments     Patient states that he is bothered by vision loss in his left eye.  His left upper lid droops but lifting the eyelid does not seem to help his vision.  HE was told at his last eye exam in [place] that he may benefit from cataract surgery.  He has mild eye dryness with his left eye and uses artificial tears twice a day in both eyes.    Mell Jacobson, COT 1:49 PM  June 1, 2022

## 2022-06-01 NOTE — PROGRESS NOTES
Chief Complaint(s) and History of Present Illness(es)     Follow Up           patient last seen 7/2021 after bilateral lower lid ectropion repair. At the time he was complaining of blurry vision and was noted to have ptosis, but did not feel that lifting his lids improved his vision. He was found to have PEE so dry eye treatment was started and it was recommended that he see a comprehensive ophthalmologist for further evaluation. Outside ophthalmologist stated he had early cataracts and felt most of his visual complaints left eye were related to dry eye.    Drops: ATs BID each eye           Assessment & Plan     Varun Ramirez is a 81 year old male with the following diagnoses:   Encounter Diagnoses   Name Primary?     Eyelid retraction or lag Yes     Dry eye        Blurry vision left eye   - pt using ATs 1-2x/day without improvement  - vision worsens in bright lights. He has early cataracts left eye> right eye with central anterior corneal spokes left eye that may be contributing to visual changes.   Plan:  - increase ATs to QID each eye  - start warm compresses BID each eye  - start lid scrubs BID ou   - start AT ointment at bedtime   - consider referral to comprehensive ophthalmology for cataract preop evaluation     Bilateral upper lid ptosis  - pt does not notice improvement in his blurry vision left eye when lifting his lid  - will monitor for now    S/p Bilateral lower lids ectropion  Repair 2021  - He does have negative malar vector and as such has mild inferior scleral show. However no lid lag, pt closing his eyes well. Could consider revision if bothered by this in the future.     Cherri Rao MD Oculoplastic Fellow    Agree with above, improved lower lid position but still lower lid retraction due to negative malar vector.     Artificial tears, cataract eval with Dr. Hernandes, left eye.       Patient disposition:   No follow-ups on file.        Attending Physician Attestation: Complete documentation  of historical and exam elements from today's encounter can be found in the full encounter summary report (not reduplicated in this progress note). I personally obtained the chief complaint(s) and history of present illness. I confirmed and edited as necessary the review of systems, past medical/surgical history, family history, social history, and examination findings as documented by others; and I examined the patient myself. I personally reviewed the relevant tests, images, and reports as documented above. I formulated and edited as necessary the assessment and plan and discussed the findings and management plan with the patient.  -Carole Ramirez MD

## 2022-06-01 NOTE — LETTER
Dear Dr. Hernandes,    I had the pleasure of seeing Varun Ramirez in follow up. He is an 81 year old gentleman who was referred to me by Dr. Trevizo in November of 2020. 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 His biggest issue is blurring of the vision in the left eye which has progressively worsened. He denies dry eye symptoms, and has tried artificial tears. He has tried lifting his upper eyelids and this does not result in improved vision.    On exam his vision is 20/20 right eye and 20/50 left eye. He has both lower eyelid retraction but ok lid tone. He has upper eyelid ptosis. Anterior segment examination reveals 1-2+ nuclear sclerosis and cortical changes right eye and a 2+ nuclear sclerosis and cortical changes left eye, undilated. Undilated funduscopic examination reveals a large cup to disk, about 0.7 right eye and 0.8 left eye.     It is my impression that Mr. Ramirez has mutliple ocular issues, but his main is blurring vision in the left eye. I suspect he may benefit from cataract surgery. I have asked him to return to you for cataract evaluation and complete eye exam. I am happy to see him back should he desire any further management of his eyelid position and I have asked him to use artificial tears more frequently.    Many thanks for allowing me to participate in the care of your patients.      Sincerely,    Carole Ramirez MD    Oculoplastic and Orbital Surgery   Department of Ophthalmology and Visual Neurosciences  [facility]  This report was dictated using Dragon voice recognition software.

## 2022-10-16 ENCOUNTER — HEALTH MAINTENANCE LETTER (OUTPATIENT)
Age: 82
End: 2022-10-16

## 2023-01-01 ENCOUNTER — HEALTH MAINTENANCE LETTER (OUTPATIENT)
Age: 83
End: 2023-01-01

## (undated) DEVICE — SUCTION FRAZIER 12FR W/HANDLE K73

## (undated) DEVICE — SU PDS II 0 CTX 36" Z370T

## (undated) DEVICE — LINEN GOWN OVERSIZE 5408

## (undated) DEVICE — TAPE MICROFOAM 4" 1528-4

## (undated) DEVICE — WIPES FOLEY CARE SURESTEP PROVON DFC100

## (undated) DEVICE — GLOVE PROTEXIS BLUE W/NEU-THERA 8.0  2D73EB80

## (undated) DEVICE — ROD SYN REAMER BALL TIP 2.5X950MM  351.706S

## (undated) DEVICE — GLOVE PROTEXIS W/NEU-THERA 7.5  2D73TE75

## (undated) DEVICE — DRAIN JACKSON PRATT ROUND W/TROCAR 15FR LF JP-HUR151

## (undated) DEVICE — PREP SKIN SCRUB TRAY 4461A

## (undated) DEVICE — DRAIN JACKSON PRATT RESERVOIR 400ML SU130-1000

## (undated) DEVICE — SOL NACL 0.9% IRRIG 1000ML BOTTLE 2F7124

## (undated) DEVICE — SU PROLENE 4-0 SHDA 36" 8521H

## (undated) DEVICE — SU PDS II 5-0 RB-2DA 30" Z148H

## (undated) DEVICE — DRAPE IOBAN INCISE 23X17" 6650EZ

## (undated) DEVICE — DRSG DRAIN 4X4" 7086

## (undated) DEVICE — GLOVE PROTEXIS BLUE W/NEU-THERA 8.5  2D73EB85

## (undated) DEVICE — DRAPE MAYO STAND 23X54 8337

## (undated) DEVICE — DRSG TEGADERM 4X4 3/4" 1626W

## (undated) DEVICE — SU VICRYL 0 CT-1 3X27" J430T

## (undated) DEVICE — LINEN TOWEL PACK X5 5464

## (undated) DEVICE — PREP POVIDONE IODINE SCRUB 7.5% 120ML

## (undated) DEVICE — DRAPE STOCKINETTE 8" 8586

## (undated) DEVICE — PREP DURAPREP REMOVER 4OZ 8611

## (undated) DEVICE — EYE PREP BETADINE 5% SOLUTION 30ML 0065-0411-30

## (undated) DEVICE — STRAP KNEE/BODY 31143004

## (undated) DEVICE — BLADE CLIPPER SGL USE 9680

## (undated) DEVICE — GLOVE PROTEXIS BLUE W/NEU-THERA 7.5  2D73EB75

## (undated) DEVICE — PREP DURAPREP 26ML APL 8630

## (undated) DEVICE — SU ETHIBOND 0 CT-1 CR 8X18" CX21D

## (undated) DEVICE — SU VICRYL 2-0 CT-2 27" UND J269H

## (undated) DEVICE — CATH TRAY FOLEY SURESTEP 16FR WDRAIN BAG STLK LATEX A300316A

## (undated) DEVICE — ESU GROUND PAD UNIVERSAL W/O CORD

## (undated) DEVICE — LINEN MAYO STAND COVER OVERSIZE PACK 5458

## (undated) DEVICE — PACK OCULOPLATIC SEN15OCFSD

## (undated) DEVICE — SOL WATER IRRIG 1000ML BOTTLE 2F7114

## (undated) DEVICE — DRSG AQUACEL AG 3.5X9.75" HYDROFIBER 412011

## (undated) DEVICE — SU PDS II 3-0 PS-1 18" Z683G

## (undated) DEVICE — SU SILK 2-0 SH 30" K833H

## (undated) DEVICE — ESU EYE HIGH TEMP 65410-183

## (undated) DEVICE — SU SILK 2-0 TIE 12X30" A305H

## (undated) DEVICE — BLADE KNIFE SURG 10 371110

## (undated) DEVICE — DRSG AQUACEL AG 3.5X13.75" HYDROFIBER 412012

## (undated) DEVICE — SU SILK 3-0 SH 30" K832H

## (undated) DEVICE — SUCTION MANIFOLD DORNOCH ULTRA CART UL-CL500

## (undated) DEVICE — GLOVE PROTEXIS W/NEU-THERA 8.0  2D73TE80

## (undated) DEVICE — SU VICRYL 6-0 P-3 18" UND J492H

## (undated) DEVICE — SU VICRYL 2-0 CT 36" J957H

## (undated) DEVICE — SYR 10ML LL W/O NDL 302995

## (undated) DEVICE — DRSG ADAPTIC 3X8" 6113

## (undated) DEVICE — DRSG STERI STRIP 1/2X4" R1547

## (undated) DEVICE — Device

## (undated) DEVICE — POSITIONER ABDUCTION PILLOW FOAM MED FP-ABDUCTM

## (undated) DEVICE — GLOVE PROTEXIS W/NEU-THERA 7.0  2D73TE70

## (undated) DEVICE — LINEN BACK PACK 5440

## (undated) DEVICE — BLADE KNIFE SURG 15 371115

## (undated) DEVICE — SU ETHIBOND 1 CTX CR 8/18" CX30D

## (undated) DEVICE — PACK TOTAL HIP W/POUCH RIVERSIDE LATEX FREE

## (undated) DEVICE — SU PDS II 2-0 CT-1 27" Z339H

## (undated) RX ORDER — PROPOFOL 10 MG/ML
INJECTION, EMULSION INTRAVENOUS
Status: DISPENSED
Start: 2021-05-07

## (undated) RX ORDER — KETOROLAC TROMETHAMINE 30 MG/ML
INJECTION, SOLUTION INTRAMUSCULAR; INTRAVENOUS
Status: DISPENSED
Start: 2019-07-02

## (undated) RX ORDER — LIDOCAINE HYDROCHLORIDE 20 MG/ML
INJECTION, SOLUTION EPIDURAL; INFILTRATION; INTRACAUDAL; PERINEURAL
Status: DISPENSED
Start: 2019-07-02

## (undated) RX ORDER — ONDANSETRON 2 MG/ML
INJECTION INTRAMUSCULAR; INTRAVENOUS
Status: DISPENSED
Start: 2019-07-02

## (undated) RX ORDER — ONDANSETRON 2 MG/ML
INJECTION INTRAMUSCULAR; INTRAVENOUS
Status: DISPENSED
Start: 2021-05-07

## (undated) RX ORDER — FENTANYL CITRATE 50 UG/ML
INJECTION, SOLUTION INTRAMUSCULAR; INTRAVENOUS
Status: DISPENSED
Start: 2019-07-02

## (undated) RX ORDER — CEFAZOLIN SODIUM 1 G/3ML
INJECTION, POWDER, FOR SOLUTION INTRAMUSCULAR; INTRAVENOUS
Status: DISPENSED
Start: 2019-07-02

## (undated) RX ORDER — LIDOCAINE HYDROCHLORIDE 20 MG/ML
INJECTION, SOLUTION EPIDURAL; INFILTRATION; INTRACAUDAL; PERINEURAL
Status: DISPENSED
Start: 2021-05-07

## (undated) RX ORDER — HYDROMORPHONE HYDROCHLORIDE 1 MG/ML
INJECTION, SOLUTION INTRAMUSCULAR; INTRAVENOUS; SUBCUTANEOUS
Status: DISPENSED
Start: 2019-07-02

## (undated) RX ORDER — TETRACAINE HYDROCHLORIDE 5 MG/ML
SOLUTION OPHTHALMIC
Status: DISPENSED
Start: 2021-05-07

## (undated) RX ORDER — PHENYLEPHRINE HCL IN 0.9% NACL 1 MG/10 ML
SYRINGE (ML) INTRAVENOUS
Status: DISPENSED
Start: 2019-07-02